# Patient Record
Sex: MALE | Race: WHITE | Employment: FULL TIME | URBAN - METROPOLITAN AREA
[De-identification: names, ages, dates, MRNs, and addresses within clinical notes are randomized per-mention and may not be internally consistent; named-entity substitution may affect disease eponyms.]

---

## 2017-11-01 ENCOUNTER — HOSPITAL ENCOUNTER (INPATIENT)
Facility: CLINIC | Age: 27
LOS: 5 days | Discharge: HOME | DRG: 897 | End: 2017-11-06
Attending: EMERGENCY MEDICINE | Admitting: PSYCHIATRY & NEUROLOGY
Payer: COMMERCIAL

## 2017-11-01 DIAGNOSIS — F11.23 OPIOID DEPENDENCE WITH WITHDRAWAL (H): ICD-10-CM

## 2017-11-01 DIAGNOSIS — F11.20 OPIOID USE DISORDER, SEVERE, DEPENDENCE (H): Primary | ICD-10-CM

## 2017-11-01 DIAGNOSIS — F41.9 ANXIETY DISORDER, UNSPECIFIED TYPE: ICD-10-CM

## 2017-11-01 DIAGNOSIS — F11.20 CONTINUOUS OPIOID DEPENDENCE (H): ICD-10-CM

## 2017-11-01 PROBLEM — F19.10 POLYSUBSTANCE ABUSE (H): Status: ACTIVE | Noted: 2017-11-01

## 2017-11-01 LAB
AMPHETAMINES UR QL SCN: NEGATIVE
BARBITURATES UR QL: NEGATIVE
BENZODIAZ UR QL: POSITIVE
CANNABINOIDS UR QL SCN: POSITIVE
COCAINE UR QL: NEGATIVE
ETHANOL UR QL SCN: NEGATIVE
OPIATES UR QL SCN: POSITIVE

## 2017-11-01 PROCEDURE — 12800012 ZZH R&B CD MH INTERMEDIATE ADULT

## 2017-11-01 PROCEDURE — 80320 DRUG SCREEN QUANTALCOHOLS: CPT | Performed by: FAMILY MEDICINE

## 2017-11-01 PROCEDURE — 99284 EMERGENCY DEPT VISIT MOD MDM: CPT | Mod: Z6 | Performed by: EMERGENCY MEDICINE

## 2017-11-01 PROCEDURE — 25000132 ZZH RX MED GY IP 250 OP 250 PS 637: Performed by: EMERGENCY MEDICINE

## 2017-11-01 PROCEDURE — 80307 DRUG TEST PRSMV CHEM ANLYZR: CPT | Performed by: FAMILY MEDICINE

## 2017-11-01 PROCEDURE — HZ2ZZZZ DETOXIFICATION SERVICES FOR SUBSTANCE ABUSE TREATMENT: ICD-10-PCS | Performed by: PSYCHIATRY & NEUROLOGY

## 2017-11-01 PROCEDURE — 99285 EMERGENCY DEPT VISIT HI MDM: CPT | Mod: 25 | Performed by: EMERGENCY MEDICINE

## 2017-11-01 PROCEDURE — 25000132 ZZH RX MED GY IP 250 OP 250 PS 637: Performed by: PSYCHIATRY & NEUROLOGY

## 2017-11-01 RX ORDER — FOLIC ACID 1 MG/1
1 TABLET ORAL DAILY
Status: DISCONTINUED | OUTPATIENT
Start: 2017-11-01 | End: 2017-11-06 | Stop reason: HOSPADM

## 2017-11-01 RX ORDER — LANOLIN ALCOHOL/MO/W.PET/CERES
100 CREAM (GRAM) TOPICAL DAILY
Status: DISPENSED | OUTPATIENT
Start: 2017-11-01 | End: 2017-11-04

## 2017-11-01 RX ORDER — ACETAMINOPHEN 325 MG/1
650 TABLET ORAL EVERY 4 HOURS PRN
Status: DISCONTINUED | OUTPATIENT
Start: 2017-11-01 | End: 2017-11-06 | Stop reason: HOSPADM

## 2017-11-01 RX ORDER — BUPRENORPHINE 2 MG/1
4 TABLET SUBLINGUAL
Status: DISCONTINUED | OUTPATIENT
Start: 2017-11-01 | End: 2017-11-06 | Stop reason: HOSPADM

## 2017-11-01 RX ORDER — BISACODYL 10 MG
10 SUPPOSITORY, RECTAL RECTAL DAILY PRN
Status: DISCONTINUED | OUTPATIENT
Start: 2017-11-01 | End: 2017-11-06 | Stop reason: HOSPADM

## 2017-11-01 RX ORDER — MULTIPLE VITAMINS W/ MINERALS TAB 9MG-400MCG
1 TAB ORAL DAILY
Status: DISCONTINUED | OUTPATIENT
Start: 2017-11-01 | End: 2017-11-06 | Stop reason: HOSPADM

## 2017-11-01 RX ORDER — ALUMINA, MAGNESIA, AND SIMETHICONE 2400; 2400; 240 MG/30ML; MG/30ML; MG/30ML
30 SUSPENSION ORAL EVERY 4 HOURS PRN
Status: DISCONTINUED | OUTPATIENT
Start: 2017-11-01 | End: 2017-11-06 | Stop reason: HOSPADM

## 2017-11-01 RX ORDER — BUPRENORPHINE 2 MG/1
4 TABLET SUBLINGUAL 2 TIMES DAILY
Status: DISCONTINUED | OUTPATIENT
Start: 2017-11-02 | End: 2017-11-06 | Stop reason: HOSPADM

## 2017-11-01 RX ORDER — HYDROXYZINE HYDROCHLORIDE 25 MG/1
25-50 TABLET, FILM COATED ORAL EVERY 4 HOURS PRN
Status: DISCONTINUED | OUTPATIENT
Start: 2017-11-01 | End: 2017-11-06 | Stop reason: HOSPADM

## 2017-11-01 RX ORDER — NICOTINE 21 MG/24HR
1 PATCH, TRANSDERMAL 24 HOURS TRANSDERMAL DAILY
Status: DISCONTINUED | OUTPATIENT
Start: 2017-11-01 | End: 2017-11-02 | Stop reason: ALTCHOICE

## 2017-11-01 RX ORDER — PHENOBARBITAL 32.4 MG/1
32.4 TABLET ORAL 3 TIMES DAILY
Status: DISCONTINUED | OUTPATIENT
Start: 2017-11-01 | End: 2017-11-02

## 2017-11-01 RX ORDER — DIAZEPAM 5 MG
5-20 TABLET ORAL EVERY 30 MIN PRN
Status: DISCONTINUED | OUTPATIENT
Start: 2017-11-01 | End: 2017-11-02

## 2017-11-01 RX ORDER — TRAZODONE HYDROCHLORIDE 50 MG/1
50 TABLET, FILM COATED ORAL
Status: DISCONTINUED | OUTPATIENT
Start: 2017-11-01 | End: 2017-11-06 | Stop reason: HOSPADM

## 2017-11-01 RX ADMIN — MULTIPLE VITAMINS W/ MINERALS TAB 1 TABLET: TAB at 16:43

## 2017-11-01 RX ADMIN — Medication 100 MG: at 16:43

## 2017-11-01 RX ADMIN — NICOTINE 1 PATCH: 21 PATCH, EXTENDED RELEASE TRANSDERMAL at 16:42

## 2017-11-01 RX ADMIN — PHENOBARBITAL 32.4 MG: 32.4 TABLET ORAL at 20:53

## 2017-11-01 RX ADMIN — NICOTINE POLACRILEX 4 MG: 4 GUM, CHEWING ORAL at 11:42

## 2017-11-01 RX ADMIN — PHENOBARBITAL 32.4 MG: 32.4 TABLET ORAL at 16:43

## 2017-11-01 RX ADMIN — FOLIC ACID 1 MG: 1 TABLET ORAL at 16:43

## 2017-11-01 ASSESSMENT — ENCOUNTER SYMPTOMS
SHORTNESS OF BREATH: 0
FEVER: 0
LIGHT-HEADEDNESS: 0
BACK PAIN: 0
DIFFICULTY URINATING: 0
ABDOMINAL PAIN: 0
VOMITING: 0
NECK STIFFNESS: 0
BRUISES/BLEEDS EASILY: 0
ADENOPATHY: 0
CHILLS: 0
COLOR CHANGE: 0
NAUSEA: 0
AGITATION: 0
POLYDIPSIA: 0
NECK PAIN: 0

## 2017-11-01 ASSESSMENT — ACTIVITIES OF DAILY LIVING (ADL)
DRESS: INDEPENDENT
ORAL_HYGIENE: INDEPENDENT
GROOMING: INDEPENDENT

## 2017-11-01 NOTE — PROGRESS NOTES
11/01/17 1421   Patient Belongings   Did you bring any home meds/supplements to the hospital?  Yes   Disposition of meds  Sent to security/pharmacy per site process   Patient Belongings clothing   Disposition of Belongings Kept with patient;Sent to security per site process;Other (see comment)   Belongings Search Yes   Clothing Search Yes   Second Staff GEM Weaver, RN completed search     Backpack, coat, shoes, belt in storage    Cell, wallet,  in locked drawer    Keys, tobacco, papers, e cig/juice, metal file in sharps    $22.00 cash, MC, 2 Visa, [place], meds in security    A               Admission:  I am responsible for any personal items that are not sent to the safe or pharmacy.  [facility] is not responsible for loss, theft or damage of any property in my possession.    Signature:  _________________________________ Date: _______  Time: _____                                              Staff Signature:  ____________________________ Date: ________  Time: _____      2nd Staff person, if patient is unable/unwilling to sign:    Signature: ________________________________ Date: ________  Time: _____     Discharge:  [facility] has returned all of my personal belongings:    Signature: _________________________________ Date: ________  Time: _____                                          Staff Signature:  ____________________________ Date: ________  Time: _____

## 2017-11-01 NOTE — PHARMACY-ADMISSION MEDICATION HISTORY
Admission medication history interview status for the October 8, 2017 admission is complete. See Epic admission navigator for allergy information, pharmacy, prior to admission medications and immunization status.     Medication history interview sources: Patient    Medication compliance: N/A - patient not prescribed medications    Changes made to PTA medication list (reason)  Added: none  Deleted: none  Changed: none    Additional medication history information (including reliability of information, actions taken by pharmacist):  - Patient denies taking/being prescribed prescription medications for over 6 months and denies taking over-the-counter (OTC) products such as vitamins, supplements.  - Pt takes magnesium tablets as needed for constipation.   - Pt was prescribed lisinopril sometime in 2016, however he has not taken it since early 2017. He says his blood pressure has been normal recently without it.  - He has not had a flu shot this year and is not certain if he wants one.       Prior to Admission medications    Not on File       Time spent: 5 minutes    Medication history completed by:   Belia Abreu, PharmD

## 2017-11-01 NOTE — ED PROVIDER NOTES
"  History     Chief Complaint   Patient presents with     Addiction Problem     \"My brother woke me up and brought me here.\" Opiate (heroin, oxycontin, cocaine) use. \"I use anything.\"     HPI  Elvin Goff is a 27-year-old man presenting with willingness to check into detox for management of opiate dependence. Patient smokes approximately 0.5 g of heroin on a daily basis. Last use was last night. No suicidal or homicidal thoughts. No fevers or pain. No other concerns or complaints.       This part of the document was transcribed by Jackelyn Fang, Medical Scribe.   I have reviewed the Medications, Allergies, Past Medical and Surgical History, and Social History in the [employer] system.  History reviewed. No pertinent past medical history.    History reviewed. No pertinent surgical history.    No family history on file.    Social History   Substance Use Topics     Smoking status: Current Every Day Smoker     Smokeless tobacco: Current User     Alcohol use Yes      Comment: 3-4 x per week       Current Facility-Administered Medications   Medication     hydrOXYzine (ATARAX) tablet 25-50 mg     acetaminophen (TYLENOL) tablet 650 mg     alum & mag hydroxide-simethicone (MYLANTA ES/MAALOX  ES) suspension 30 mL     magnesium hydroxide (MILK OF MAGNESIA) suspension 30 mL     bisacodyl (DULCOLAX) Suppository 10 mg     traZODone (DESYREL) tablet 50 mg     nicotine Patch in Place     [START ON 11/2/2017] nicotine patch REMOVAL     nicotine (NICODERM CQ) 21 MG/24HR 24 hr patch 1 patch     buprenorphine (SUBUTEX) sublingual tablet 4 mg     [START ON 11/2/2017] buprenorphine (SUBUTEX) sublingual tablet 4 mg     PHENobarbital (LUMINAL) tablet 32.4 mg     diazepam (VALIUM) tablet 5-20 mg     thiamine tablet 100 mg     folic acid (FOLVITE) tablet 1 mg     multivitamin, therapeutic with minerals (THERA-VIT-M) tablet 1 tablet      No Known Allergies    Review of Systems   Constitutional: Negative for chills and fever.   HENT: " "Negative for congestion.    Eyes: Negative for visual disturbance.   Respiratory: Negative for shortness of breath.    Cardiovascular: Negative for chest pain.   Gastrointestinal: Negative for abdominal pain, nausea and vomiting.   Endocrine: Negative for polydipsia and polyuria.   Genitourinary: Negative for difficulty urinating.   Musculoskeletal: Negative for back pain, neck pain and neck stiffness.   Skin: Negative for color change.   Neurological: Negative for light-headedness.   Hematological: Negative for adenopathy. Does not bruise/bleed easily.   Psychiatric/Behavioral: Negative for agitation, behavioral problems and suicidal ideas.       Physical Exam   BP: 106/69  Pulse: 71  Temp: 96.6  F (35.9  C)  Resp: 16  Height: 181.6 cm (5' 11.5\")  Weight: 72.1 kg (159 lb)  SpO2: 100 %      Physical Exam   Constitutional: He is oriented to person, place, and time. He appears well-developed and well-nourished. No distress.   HENT:   Head: Normocephalic and atraumatic.   Mouth/Throat: Oropharynx is clear and moist. No oropharyngeal exudate.   Eyes: Conjunctivae and EOM are normal. No scleral icterus.   Neck: Normal range of motion.   Cardiovascular: Normal rate, normal heart sounds and intact distal pulses.    Pulmonary/Chest: Effort normal and breath sounds normal. No respiratory distress. He has no wheezes.   Abdominal: Soft. Bowel sounds are normal. There is no tenderness.   Musculoskeletal: He exhibits no edema or tenderness.   Neurological: He is alert and oriented to person, place, and time. No cranial nerve deficit. He exhibits normal muscle tone. Coordination normal.   Skin: Skin is warm. No rash noted. He is not diaphoretic.   Psychiatric: He has a normal mood and affect. His behavior is normal. Judgment and thought content normal.   Nursing note and vitals reviewed.      ED Course     ED Course     Procedures             Critical Care time:  none             Labs Ordered and Resulted from Time of ED Arrival " Up to the Time of Departure from the ED   DRUG ABUSE SCREEN 6 CHEM DEP URINE ([facility]) - Abnormal; Notable for the following:        Result Value    Benzodiazepine Qual Urine Positive (*)     Cannabinoids Qual Urine Positive (*)     Opiates Qualitative Urine Positive (*)     All other components within normal limits            Assessments & Plan (with Medical Decision Making)   27-year-old man presenting with willingness to check into detox. There is a bed on hold for him. He ll be admitted to detox.     I have reviewed the nursing notes.    I have reviewed the findings, diagnosis, plan and need for follow up with the patient.  This part of the document was transcribed by Jackelyn Fang Medical Scribe.   There are no discharge medications for this patient.      Final diagnoses:   Continuous opioid dependence (H)     I was physically present and have reviewed and verified the accuracy of this note documented by my scribe.    Daniela Collier MD      11/1/2017   [facility], [facility] EMERGENCY DEPARTMENT     Daniela Collier MD  11/01/17 4166

## 2017-11-01 NOTE — IP AVS SNAPSHOT
[facility]    [address] [place] [id number]    Phone:  [phone]                                       After Visit Summary   11/1/2017    Elvin Goff    MRN: [id number]           After Visit Summary Signature Page     I have received my discharge instructions, and my questions have been answered. I have discussed any challenges I see with this plan with the nurse or doctor.    ..........................................................................................................................................  Patient/Patient Representative Signature      ..........................................................................................................................................  Patient Representative Print Name and Relationship to Patient    ..................................................               ................................................  Date                                            Time    ..........................................................................................................................................  Reviewed by Signature/Title    ...................................................              ..............................................  Date                                                            Time

## 2017-11-01 NOTE — PLAN OF CARE
Problem: General Plan of Care (Inpatient Behavioral)  Goal: Individualization/Patient Specific Goal (IP Behavioral)  The patient and/or their representative will achieve their patient-specific goals related to the plan of care.    The patient-specific goals include:   Patient will identify reason(s) for hospitalization from their perspective.  Patient will identify a goal for discharge.  Patient will identify triggers that may increase their risk for relapse.  Patient will identify coping skills they can do to stay well.   Patient will identify their support system to demonstrate readiness for discharge.

## 2017-11-01 NOTE — IP AVS SNAPSHOT
MRN:[id number]                      After Visit Summary   11/1/2017    Elvin Goff    MRN: [id number]           Thank you!     Thank you for choosing [facility] for your care. Our goal is always to provide you with excellent care.        Patient Information     Date Of Birth          1990        Designated Caregiver       Most Recent Value    Caregiver    Will someone help with your care after discharge? no      About your hospital stay     You were admitted on:  November 1, 2017 You last received care in the:  [facility]    You were discharged on:  November 6, 2017       Who to Call     For medical emergencies, please call 911.  For non-urgent questions about your medical care, please call your primary care provider or clinic, None          Attending Provider     Provider Specialty    Daniela Collier MD Emergency Medicine    Landry, Tori Soria MD Pediatrics       Primary Care Provider    Physician No Ref-Primary      Further instructions from your care team       Behavioral [place] Discharge Planning and Instructions  THANK YOU FOR CHOOSING THE [facility], HEALTH  [place] [address]: [phone]    Summary: You were admitted to [address] [place] [address] on November 1, 2017 for detoxification from heroin, sedative hypnotics and alcohol. Your withdrawal is stable and you are being discharged today.  A medical examination was performed that included lab work. You have met with a  and opted to trial Suboxone maintenance @ [facility] on November 7, 2017. You have a Chemical Dependency assessment scheduled at [facility] on Tuesday November 7, 2017 @ 1:00 PM. Your appointment is listed below.  Please make your recovery a priority, Elvin.     Main Diagnosis:  Polysubstance Dependence    Major Treatments, Procedures and Findings:  You were detoxified from opiates using Buprenorphine. Your Benzodiazepine withdrawal was managed with Phenobarbital.  You have had a chemical dependency assessment.  You have had blood drawn, and the results have been reviewed with you.  Please take a copy of your laboratory work with you to your next provider appointment.    Symptoms to Report:  If you experience more anxiety, confusion, sleeplessness, deep sadness or thoughts of suicide, notify your treatment team or notify your primary care physician. IF THE SYMPTOMS YOU ARE EXPERIENCING ARE A MEDICAL EMERGENCY, CALL 911 IMMEDIATELY.     Lifestyle Adjustment: Adjust your lifestyle to get enough sleep, relaxation, exercise and excellent nutrition. Continue to develop healthy coping skills to decrease stress and promote a sober living environment. Do not use alcohol, illegal drugs or addictive medications other than what is currently prescribed. AA, NA, and a sponsor are excellent resources for support. There has been a recent increase in opioid overdoses and deaths. After even a short period of no drug use your tolerance level with be lowered. It is unsafe to believe you can use the same amount of drugs that you did prior to this period of no drug use. Returning to your drug using environment and contact with your drug using friends will put you at a high risk of relapsing.     Suboxone Maintenance Follow-Up:  Date and Time: November 7, 2017 @ 8:00 AM  Provider  [facility]  [address] [place] [id number]  [phone]    Chemical Dependency Evaluation Follow-Up:  Date and Time: Tuesday November 7th at 1:00 pm  Provider: Tamar ASCENCIO  [address]  [place] [address]  [phone]    Primary Provider Follow Up-follow up if nausea and vomiting continue  [facility]  [address] [place] [address]  Phone: [phone]    Resources:  [facility] 727-792-2181 Support Group:  AA/NA and Sponsor/support.  Crisis Intervention: 337.670.1993 or 818-519-5171 (TTY: 707.854.6436). Call anytime for help.  National  [place] on Mental Illness (www.[place].salma.org): 556.835.6401 or 011-416-3538.  Alcoholics Anonymous (www.alcoholics-anonymous.org): Check your phone book for your local chapter.  Suicide Awareness Voices of Education (www.save.org): 636.974.8449  National Suicide Prevention Line (www.mentalAvePointmn.org): 938-736-MZSX (6187)  Mental Health Consumer/Survivor Network of [place] (www.mhcsn.net): 201.473.1545 or 879-392-4881.  Mental Health Association of [place] (www.mentalhealth.org): 527.702.2626 or 205-500-8109  Substance Abuse and Mental Health Services. (www.samhsa.gov)    [place] Recovery Connection ([facility])  [facility] connects people seeking recovery to resources that help foster and sustain long-term recovery.  Whether you are seeking resources for treatment, transportation, housing, job training, education, health care or other pathways to recovery, [facility] is a great place to start. 171.386.7157 www.[facility].org    General Medication Instruction: See your medication papers for instructions. Take all medicines as directed.  Make no changes unless your doctor suggests them. Go to all your doctor visits.  Be sure to have all your required lab tests. This way, your medicines may be refilled on time. Do not use any drugs not prescribed by your provider. AA/NA and sponsors are excellent resources for support. Avoid alcohol at all costs!    Please Note:  If you have any questions at anytime after you are discharged please call the [facility] [place] detoxification [address] at [phone].  [facility], Behavioral Intake [phone]. Please take this discharge folder with you to all your follow up appointments, it contains your lab results, diagnosis, medication list and discharge recommendations. [facility]OSING THE [facility]      Pending Results     No orders found from 10/30/2017 to 11/2/2017.            Statement of Approval     Ordered           "17 1113  I have reviewed and agree with all the recommendations and orders detailed in this document.  EFFECTIVE NOW     Approved and electronically signed by:  Tori Alatorre MD             Admission Information     Date & Time Provider Department Dept. Phone    2017 Tori Alatorre MD [facility] [phone]      Your Vitals Were     Blood Pressure Pulse Temperature Respirations Height Weight    99/66 61 97.3  F (36.3  C) 16 1.816 m (5' 11.5\") 71.2 kg (157 lb)    Pulse Oximetry BMI (Body Mass Index)                99% 21.59 kg/m2          MyChart Information     [employer] lets you send messages to your doctor, view your test results, renew your prescriptions, schedule appointments and more. To sign up, go to [email or web address] . Click on \"Log in\" on the left side of the screen, which will take you to the Welcome page. Then click on \"Sign up Now\" on the right side of the page.     You will be asked to enter the access code listed below, as well as some personal information. Please follow the directions to create your username and password.     Your access code is: [email or web address]  Expires: 2018  7:50 AM     Your access code will  in 90 days. If you need help or a new code, please call your [facility] clinic or [phone].        Care EveryWhere ID     This is your Care EveryWhere ID. This could be used by other organizations to access your [facility] medical records  [id number]        Equal Access to Services     NURY PRAKASH : Hadii librado andino hadasho Soomaali, waaxda luqadaha, qaybta kaalmada adeegyada, johnny sanders . So [facility] [phone].    ATENCIÓN: Si habla español, tiene a jacques disposición servicios gratuitos de asistencia lingüística. Llame al [phone].    We comply with applicable federal civil rights laws and [place] laws. We do not discriminate on the basis of race, color, national origin, age, disability, sex, " sexual orientation, or gender identity.               Review of your medicines      START taking        Dose / Directions    cloNIDine 0.1 MG/24HR WK patch   Commonly known as:  CATAPRES-TTS1        Dose:  1 patch   Place 1 patch onto the skin once a week   Quantity:  4 patch   Refills:  0       gabapentin 300 MG capsule   Commonly known as:  NEURONTIN        Dose:  300 mg   Take 1 capsule (300 mg) by mouth 3 times daily   Quantity:  90 capsule   Refills:  0       naloxone 1 mg/mL for intranasal kit (2 syringes with 2 mucosal atomizer device)   Commonly known as:  NARCAN        In opioid overdose put cone in nostril and push 1/2 of contents into each nostril.  Repeat every 3 min if no response until help arrives.   Quantity:  1 kit   Refills:  1            Where to get your medicines      These medications were sent to [facility] [place], [place] - [address]  [address] [address]     Phone:  [phone]     cloNIDine 0.1 MG/24HR WK patch    gabapentin 300 MG capsule    naloxone 1 mg/mL for intranasal kit (2 syringes with 2 mucosal atomizer device)                Protect others around you: Learn how to safely use, store and throw away your medicines at www.disposemymeds.org.             Medication List: This is a list of all your medications and when to take them. Check marks below indicate your daily home schedule. Keep this list as a reference.      Medications           Morning Afternoon Evening Bedtime As Needed    cloNIDine 0.1 MG/24HR WK patch   Commonly known as:  CATAPRES-TTS1   Place 1 patch onto the skin once a week   Last time this was given:  1 patch on 11/3/2017 10:02 AM                                gabapentin 300 MG capsule   Commonly known as:  NEURONTIN   Take 1 capsule (300 mg) by mouth 3 times daily                                         naloxone 1 mg/mL for intranasal kit (2 syringes with 2 mucosal atomizer device)   Commonly known as:  NARCAN   In  opioid overdose put cone in nostril and push 1/2 of contents into each nostril.  Repeat every 3 min if no response until help arrives.

## 2017-11-02 PROBLEM — F41.9 ANXIETY DISORDER, UNSPECIFIED TYPE: Status: ACTIVE | Noted: 2017-11-02

## 2017-11-02 PROBLEM — F11.23 OPIOID DEPENDENCE WITH WITHDRAWAL (H): Status: ACTIVE | Noted: 2017-11-02

## 2017-11-02 PROBLEM — F13.939: Status: ACTIVE | Noted: 2017-11-02

## 2017-11-02 PROBLEM — F11.20 OPIOID USE DISORDER, SEVERE, DEPENDENCE (H): Status: ACTIVE | Noted: 2017-11-01

## 2017-11-02 PROBLEM — F13.20 SEVERE SEDATIVE, HYPNOTIC, OR ANXIOLYTIC USE DISORDER (H): Status: ACTIVE | Noted: 2017-11-02

## 2017-11-02 LAB
ALBUMIN SERPL-MCNC: 3.9 G/DL (ref 3.4–5)
ALP SERPL-CCNC: 87 U/L (ref 40–150)
ALT SERPL W P-5'-P-CCNC: 22 U/L (ref 0–70)
ANION GAP SERPL CALCULATED.3IONS-SCNC: 6 MMOL/L (ref 3–14)
AST SERPL W P-5'-P-CCNC: 15 U/L (ref 0–45)
BILIRUB SERPL-MCNC: 0.6 MG/DL (ref 0.2–1.3)
BUN SERPL-MCNC: 17 MG/DL (ref 7–30)
CALCIUM SERPL-MCNC: 9.4 MG/DL (ref 8.5–10.1)
CHLORIDE SERPL-SCNC: 107 MMOL/L (ref 94–109)
CO2 SERPL-SCNC: 30 MMOL/L (ref 20–32)
CREAT SERPL-MCNC: 0.73 MG/DL (ref 0.66–1.25)
ERYTHROCYTE [DISTWIDTH] IN BLOOD BY AUTOMATED COUNT: 12 % (ref 10–15)
GFR SERPL CREATININE-BSD FRML MDRD: >90 ML/MIN/1.7M2
GLUCOSE SERPL-MCNC: 105 MG/DL (ref 70–99)
HBV CORE AB SERPL QL IA: NONREACTIVE
HBV SURFACE AG SERPL QL IA: NONREACTIVE
HCT VFR BLD AUTO: 42.7 % (ref 40–53)
HGB BLD-MCNC: 14.4 G/DL (ref 13.3–17.7)
HIV 1+2 AB+HIV1 P24 AG SERPL QL IA: NONREACTIVE
MCH RBC QN AUTO: 30.6 PG (ref 26.5–33)
MCHC RBC AUTO-ENTMCNC: 33.7 G/DL (ref 31.5–36.5)
MCV RBC AUTO: 91 FL (ref 78–100)
PLATELET # BLD AUTO: 167 10E9/L (ref 150–450)
POTASSIUM SERPL-SCNC: 3.8 MMOL/L (ref 3.4–5.3)
PROT SERPL-MCNC: 7 G/DL (ref 6.8–8.8)
RBC # BLD AUTO: 4.71 10E12/L (ref 4.4–5.9)
SODIUM SERPL-SCNC: 143 MMOL/L (ref 133–144)
WBC # BLD AUTO: 9.1 10E9/L (ref 4–11)

## 2017-11-02 PROCEDURE — 25000132 ZZH RX MED GY IP 250 OP 250 PS 637: Performed by: PSYCHIATRY & NEUROLOGY

## 2017-11-02 PROCEDURE — 85027 COMPLETE CBC AUTOMATED: CPT | Performed by: PSYCHIATRY & NEUROLOGY

## 2017-11-02 PROCEDURE — H2035 A/D TX PROGRAM, PER HOUR: HCPCS | Mod: HQ

## 2017-11-02 PROCEDURE — 12800012 ZZH R&B CD MH INTERMEDIATE ADULT

## 2017-11-02 PROCEDURE — 87389 HIV-1 AG W/HIV-1&-2 AB AG IA: CPT | Performed by: PSYCHIATRY & NEUROLOGY

## 2017-11-02 PROCEDURE — 80053 COMPREHEN METABOLIC PANEL: CPT | Performed by: PSYCHIATRY & NEUROLOGY

## 2017-11-02 PROCEDURE — 87340 HEPATITIS B SURFACE AG IA: CPT | Performed by: PSYCHIATRY & NEUROLOGY

## 2017-11-02 PROCEDURE — 86803 HEPATITIS C AB TEST: CPT | Performed by: PSYCHIATRY & NEUROLOGY

## 2017-11-02 PROCEDURE — 86704 HEP B CORE ANTIBODY TOTAL: CPT | Performed by: PSYCHIATRY & NEUROLOGY

## 2017-11-02 PROCEDURE — 36415 COLL VENOUS BLD VENIPUNCTURE: CPT | Performed by: PSYCHIATRY & NEUROLOGY

## 2017-11-02 PROCEDURE — 99223 1ST HOSP IP/OBS HIGH 75: CPT | Mod: AI | Performed by: PSYCHIATRY & NEUROLOGY

## 2017-11-02 RX ORDER — PHENOBARBITAL 32.4 MG/1
32.4 TABLET ORAL 2 TIMES DAILY
Status: DISPENSED | OUTPATIENT
Start: 2017-11-03 | End: 2017-11-04

## 2017-11-02 RX ORDER — PHENOBARBITAL 32.4 MG/1
32.4 TABLET ORAL ONCE
Status: COMPLETED | OUTPATIENT
Start: 2017-11-04 | End: 2017-11-04

## 2017-11-02 RX ORDER — PHENOBARBITAL 32.4 MG/1
32.4 TABLET ORAL 3 TIMES DAILY
Status: COMPLETED | OUTPATIENT
Start: 2017-11-02 | End: 2017-11-02

## 2017-11-02 RX ORDER — GABAPENTIN 300 MG/1
300 CAPSULE ORAL 3 TIMES DAILY PRN
Status: DISCONTINUED | OUTPATIENT
Start: 2017-11-02 | End: 2017-11-06 | Stop reason: HOSPADM

## 2017-11-02 RX ORDER — ONDANSETRON 4 MG/1
4 TABLET, ORALLY DISINTEGRATING ORAL EVERY 6 HOURS PRN
Status: DISCONTINUED | OUTPATIENT
Start: 2017-11-02 | End: 2017-11-06 | Stop reason: HOSPADM

## 2017-11-02 RX ADMIN — PHENOBARBITAL 32.4 MG: 32.4 TABLET ORAL at 08:26

## 2017-11-02 RX ADMIN — PHENOBARBITAL 32.4 MG: 32.4 TABLET ORAL at 16:25

## 2017-11-02 RX ADMIN — FOLIC ACID 1 MG: 1 TABLET ORAL at 08:26

## 2017-11-02 RX ADMIN — NICOTINE 1 PATCH: 21 PATCH, EXTENDED RELEASE TRANSDERMAL at 08:26

## 2017-11-02 RX ADMIN — NICOTINE POLACRILEX 8 MG: 4 GUM, CHEWING ORAL at 18:41

## 2017-11-02 RX ADMIN — TRAZODONE HYDROCHLORIDE 50 MG: 50 TABLET ORAL at 20:19

## 2017-11-02 RX ADMIN — Medication 100 MG: at 08:26

## 2017-11-02 RX ADMIN — BUPRENORPHINE HCL 4 MG: 2 TABLET SUBLINGUAL at 08:25

## 2017-11-02 RX ADMIN — PHENOBARBITAL 32.4 MG: 32.4 TABLET ORAL at 20:17

## 2017-11-02 ASSESSMENT — ACTIVITIES OF DAILY LIVING (ADL)
DRESS: INDEPENDENT
GROOMING: INDEPENDENT
ORAL_HYGIENE: INDEPENDENT
GROOMING: INDEPENDENT

## 2017-11-02 NOTE — H&P
"Addiction Medicine History and Physical    Elvin Goff MRN# [id number]   Age: 27 year old YOB: 1990     Date of Admission:  11/1/2017  Date of H&P:   November 2, 2017    Home clinic: Denies  Primary care provider: No Ref-Primary, Physician          Assessment and Plan:   Assessment:   Principal Problem:    Sedative withdrawal (H)  Active Problems:    Opioid use disorder, severe, dependence (H)    Opioid dependence with withdrawal (H)    Severe sedative, hypnotic, or anxiolytic use disorder (H)    Moderate alcohol use disorder, with binge pattern    Anxiety disorder, unspecified type, with both social and panic components        Plan:   Medications:  - Buprenorphine 4 mg BID today, consider continuation versus taper tomorrow  - Phenobarbital taper, brief, as significant withdrawal unlikely given duration and pattern of use  - follow-up blood borne pathogen testing  Disposition:  - See CM notes for details, but at this point patient willing to speak with CM but declining specific intervention/treatment; if willing, might be DDP IOP candidate           Chief Complaint:   \"My family gave me an ultimatum.\"     History is obtained from the patient          History of Present Illness:   This patient is a 27 year old  male with a significant past medical history of gastritis due to alcohol who presents with opiate and benzodiazepine use. Reports that he was recently laid off from his job of [employer] which is somewhat due to seasonal changes and also probably somewhat from substance use. Since he hasn't been working, has been drinking at home which he thinks was concerning to his mother since he's been passed out at home quite a bit. Reports using heroin daily for about a year at this point and has used for about 10 years in total. 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 Elvin has been back in [place] for about a year and a half, lives with his mom and older brother in [place]. Additionally, has been using benzodiazepines recently and reports either using up to 4 mg daily of alprazolam or 5-10 mg daily of etizolam. Buys both of these online. Drinks beer until he blacks out. Reports only drinking beer after having been diagnosed with gastritis on a few occasions after drinking liquor. Last use of heroin was around midnight last night, had been using about half gram daily, mixed smoked and IV. Last IV use 3 days PTA. Got first dose of buprenorphine this morning and feels it is helping manage symptoms of withdrawal he was experiencing (nausea, insomnia, rhinorrhea, watery eyes). At this point, he is unsure of how he'd like to proceed but is willing to stay until tomorrow. May be interested in methadone maintenance and is willing to hear about various treatment options but currently somewhat hesitant. Of note, reports history of significant social anxiety for which substances seem to help manage. Has a history of panic attack in the context of withdrawal and reports low mood for as long as he can remember. Denies h/o thoughts of suicide. In past week had some ulnar neuropathy symptoms after passing out while intoxicated. Strength is improved in this (right) hand. Missed vein on dorsum of left had within the past week - site healed. Is admitted voluntarily.           Past Medical History:   I  History reviewed. No pertinent past medical history. Patient denies.         Past Surgical History:    I have reviewed this patient's past surgical history  History reviewed. No pertinent surgical history.          Social History:   I have reviewed this patient's social history, see HPI, and living with mother and brother, although they have \"given ultimatum\" re: treatment, so may be displaced if he does not follow through with any " "treatment recommendations.          Family History:   I have reviewed this patient's family history, and it is not directly pertinent to present encounter for detoxification.         Immunizations:     There is no immunization history on file for this patient.          Allergies:   All allergies reviewed and addressed  No Known Allergies          Medications:   No prescriptions prior to admission.    Found with small supply of etizolam on his person at admission.    [place]  query result:  Indicates no controlled prescriptions reported in previous 12 months.         Review of Systems:   Complete ROS performed and is negative except as indicated in HPI, and elsewhere in this note.           Physical Exam:     Vitals were reviewed  Patient Vitals for the past 12 hrs:   BP Temp Temp src Pulse Resp   11/01/17 2012 125/87 98.3  F (36.8  C) Tympanic 76 16     Constitutional:   Tired but alert, no distress     Eyes:   Anicteric, mild injection, PERRL, no nystagmus     ENT:   No rhinorrhea, mmm     Lungs:   no increased work of breathing and clear to auscultation     Cardiovascular:   Well-perfused, no murmur, no edema     Neurologic:   No tremor, normal tone, gait and coordination intact     Skin:   Injection sites without infectious signs, no jaundice, mild flushing     MENTAL STATUS EXAM  Appearance: in scrubs, disheveled  Attitude: reticent but cooperative  Behavior: no agitation or slowing  Eye Contact: focused on examiner  Speech: coherent, no pressuring  Orientation: oriented to person , place, time and situation  Mood:  \"fine\"  Affect: tired, disengaged  Thought Process: goal-directed, no FOI or BARRY  Suicidal Ideation: denies thought/intent/plan  Hallucination: denies  Insight: limited  Judgment: adequate for safety         Data:   All laboratory data reviewed  Results for orders placed or performed during the hospital encounter of 11/01/17   Drug abuse screen 6 urine (tox)   Result Value Ref Range    Amphetamine Qual " Urine Negative NEG^Negative    Barbiturates Qual Urine Negative NEG^Negative    Benzodiazepine Qual Urine Positive (A) NEG^Negative    Cannabinoids Qual Urine Positive (A) NEG^Negative    Cocaine Qual Urine Negative NEG^Negative    Ethanol Qual Urine Negative NEG^Negative    Opiates Qualitative Urine Positive (A) NEG^Negative   CBC with platelets   Result Value Ref Range    WBC 9.1 4.0 - 11.0 10e9/L    RBC Count 4.71 4.4 - 5.9 10e12/L    Hemoglobin 14.4 13.3 - 17.7 g/dL    Hematocrit 42.7 40.0 - 53.0 %    MCV 91 78 - 100 fl    MCH 30.6 26.5 - 33.0 pg    MCHC 33.7 31.5 - 36.5 g/dL    RDW 12.0 10.0 - 15.0 %    Platelet Count 167 150 - 450 10e9/L   Comprehensive metabolic panel   Result Value Ref Range    Sodium 143 133 - 144 mmol/L    Potassium 3.8 3.4 - 5.3 mmol/L    Chloride 107 94 - 109 mmol/L    Carbon Dioxide 30 20 - 32 mmol/L    Anion Gap 6 3 - 14 mmol/L    Glucose 105 (H) 70 - 99 mg/dL    Urea Nitrogen 17 7 - 30 mg/dL    Creatinine 0.73 0.66 - 1.25 mg/dL    GFR Estimate >90 >60 mL/min/1.7m2    GFR Estimate If Black >90 >60 mL/min/1.7m2    Calcium 9.4 8.5 - 10.1 mg/dL    Bilirubin Total 0.6 0.2 - 1.3 mg/dL    Albumin 3.9 3.4 - 5.0 g/dL    Protein Total 7.0 6.8 - 8.8 g/dL    Alkaline Phosphatase 87 40 - 150 U/L    ALT 22 0 - 70 U/L    AST 15 0 - 45 U/L      Attestation:  I have reviewed today's vital signs, notes, medications, and labs/studies pertinent to this encounter.    Indication for hospitalization is severe opioid use disorder with physical dependence and withdrawal; high degree of complexity due to potential withdrawal morbidity, complicated by co-occurring unknown sedative use with unknown withdrawal potential, combined substance use including alcohol and cannabis, and co-occurring unmanaged anxiety.    Dakota Alatorre MD  Pediatrics/Addiction Medicine

## 2017-11-02 NOTE — PROGRESS NOTES
Behavioral Health  Note    Behavioral Health  Spirituality Group Note    UNIT 3AW    Name: Elvin Goff YOB: 1990   MRN: [id number] Age: 27 year old      Patient attended -led group, which included discussion of spirituality, coping with illness and building resilience.    Patient attended group for 1.0 hrs.    The patient actively participated in group discussion and patient demonstrated an appreciation of topic's application for their personal circumstances. Elvin completed some brief Qi Gong movements and participated in a conversation around discernment and blessings.     Topic/Focus of today's spirituality group: [place] from Within (self)  IMR tie-in: Taking Action      Vivienne Jo MDiv, [facility]  Staff   Pager [phone]

## 2017-11-02 NOTE — PLAN OF CARE
Problem: General Plan of Care (Inpatient Behavioral)  Goal: Team Discussion  Team Plan:   BEHAVIORAL TEAM DISCUSSION     Participants: Dr. Alatorre; QUITA Ayala, SHAHIDAC; Dean De Oliveira, RN  Progress: Initial  Continued Stay Criteria/Rationale: Heroin detox  Medical/Physical: Deferred to medicine  Precautions:   Behavioral Orders   Procedures     Code 1 - Restrict to Unit     Routine Programming       As clinically indicated     Status 15       Every 15 minutes.     Withdrawal precautions   Plan: Writer met with patient to establish discharge. Reports heroin and benzo use. Patient ambilivant about change, shows some insight, but is limited. Says 80% of him wants to be clean the other 20% runs the show. Patient is willing to trail methadone maintenance stating he has tried Suboxone in the past and was nauseated.  Writer spoke with Cecilia Crowley HP coordinator ([phone]) and was given the contact information to [facility] which is network for him. Writer called and provided insurance information, the  will contact patient directly on the unit to complete intake and schedule appointment. CTC to contact Ramon Liz on Friday to confirm appointment [phone].  Rationale for change in precautions or plan: No change        Problem: Patient Care Overview  Goal: Team Discussion  Team Plan:   BEHAVIORAL TEAM DISCUSSION     Participants: Dr. Alatorre; QUITA Ayala, SONU; Dean De Oliveira, RN  Progress: Initial  Continued Stay Criteria/Rationale: Heroin detox  Medical/Physical: Deferred to medicine  Precautions:   Behavioral Orders   Procedures     Code 1 - Restrict to Unit     Routine Programming       As clinically indicated     Status 15       Every 15 minutes.     Withdrawal precautions   Plan: Writer met with patient to establish discharge. Reports heroin and benzo use. Patient ambilivant about change, shows some insight, but is limited. Says 80% of him wants to be clean the other 20% runs  the show. Patient is willing to trail methadone maintenance stating he has tried Suboxone in the past and was nauseated.  Writer spoke with Cecilia Crowley HP coordinator ([phone]) and was given the contact information to [facility] which is network for him. Writer called and provided insurance information, the  will contact patient directly on the unit to complete intake and schedule appointment. CTC to contact Ramon Liz on Friday to confirm appointment [phone].  Rationale for change in precautions or plan: No change

## 2017-11-03 PROCEDURE — 12800012 ZZH R&B CD MH INTERMEDIATE ADULT

## 2017-11-03 PROCEDURE — 25000132 ZZH RX MED GY IP 250 OP 250 PS 637: Performed by: PSYCHIATRY & NEUROLOGY

## 2017-11-03 PROCEDURE — 25000125 ZZHC RX 250: Performed by: PSYCHIATRY & NEUROLOGY

## 2017-11-03 PROCEDURE — 25000128 H RX IP 250 OP 636: Performed by: INTERNAL MEDICINE

## 2017-11-03 RX ORDER — BUPRENORPHINE 2 MG/1
4 TABLET SUBLINGUAL
Status: DISCONTINUED | OUTPATIENT
Start: 2017-11-03 | End: 2017-11-06 | Stop reason: HOSPADM

## 2017-11-03 RX ORDER — PROCHLORPERAZINE MALEATE 5 MG
5 TABLET ORAL EVERY 6 HOURS PRN
Status: DISCONTINUED | OUTPATIENT
Start: 2017-11-03 | End: 2017-11-06 | Stop reason: HOSPADM

## 2017-11-03 RX ORDER — GABAPENTIN 300 MG/1
300 CAPSULE ORAL 3 TIMES DAILY
Qty: 90 CAPSULE | Refills: 0 | Status: SHIPPED | OUTPATIENT
Start: 2017-11-03 | End: 2018-01-01

## 2017-11-03 RX ADMIN — PROCHLORPERAZINE EDISYLATE 5 MG: 5 INJECTION INTRAMUSCULAR; INTRAVENOUS at 17:45

## 2017-11-03 RX ADMIN — PHENOBARBITAL 32.4 MG: 32.4 TABLET ORAL at 20:49

## 2017-11-03 RX ADMIN — BUPRENORPHINE HCL 4 MG: 2 TABLET SUBLINGUAL at 08:35

## 2017-11-03 RX ADMIN — BUPRENORPHINE HCL 4 MG: 2 TABLET SUBLINGUAL at 17:57

## 2017-11-03 RX ADMIN — ONDANSETRON 4 MG: 4 TABLET, ORALLY DISINTEGRATING ORAL at 10:02

## 2017-11-03 RX ADMIN — CLONIDINE 1 PATCH: 0.1 PATCH TRANSDERMAL at 10:02

## 2017-11-03 NOTE — PLAN OF CARE
"Problem: General Plan of Care (Inpatient Behavioral)  Goal: Discharge Planning  SBAR  Elvin Goff is a 27 year old year old male with a chief complaint of Addiction Problem (\"My brother woke me up and brought me here.\" Opiate (heroin, oxycontin, cocaine) use. \"I use anything.\")    S = Situation:   Patient's brother requested a care conference  B  = Background:   With Jasson's permission, Patient's brother and Jasson asked questions about current treatment regimen, plan for discharge and future expectations for Jasson's continued recovery.  A  =  Assessment:   Patient' brother presented poor understanding of the depth and breadth of Jasson's chemical dependency issue.  He reports that he has been pre-occupied with his other brother's alcoholism and has likely been in denial about Jasson's opiate withdrawal.  Discussed Jasson's current medication regimen, the possibility of starting methadone maintenance or continuing suboxone maintenance, Jasson's social network, drug using friends, including those who drink heavily, and cross addiction.  Jasson lacks sober support and was dismissive about having friends or family in recovery.  Jasson asked if he could leave, be discharged.  Pt was informed that he needs a doctor's order and he should discuss bridging therapy with Dr. Alatorre and the plan for taper or termination of the phenobarbital.  Pt verbalized understanding.  Pt's brother is supportive and wants to help but he is also an overseer of his other brother who is abusing alcohol and an elderly mother--he also works 60 hours a week.  R =   Request or Recommendation:   Discussed the importance of establishing a recovery program, finding sober support and making a decision about maintenance therapy and following through with it.  Jasson focused on questions regarding speedy discharge during the meeting with his brother.  He has poor insight into the seriousness of his addiction.  If patient is agreeable to " participating in an outpatient program it will be on his brother's urging, unlikely his own volition.

## 2017-11-03 NOTE — PROGRESS NOTES
Addiction Medicine Progress Note          Assessment and Plan:   Assessment:   Principal Problem:    Sedative withdrawal (H), minimal, but completing brief barbiturate taper  Active Problems:    Opioid use disorder, severe, dependence (H)    Opioid dependence with withdrawal (H), continues as patient declined evening dose buprenorphine last night    Severe sedative, hypnotic, or anxiolytic use disorder (H)    Anxiety disorder, unspecified type, with social and panic components        Plan:   Encourage patient to continue buprenorphine induction  He is contemplating buprenorphine versus methadone maintenance, and would benefit from services that could address underlying anxiety  He is encouraged to stay until symptoms stabilize, but may discharge if requested         Interval History:   No acute events. Did stay overnight, but declined PM dose buprenorphine so felt quite sick this morning. Wiling to take AM dose but remains ambivalent about maintenance options and about completing treatment course here as well as treatment planning. He was informed that we added hepatitis C as add on to lab tests - he declines new blood draw if needed.            Review of Systems:   Complete ROS performed and is negative except as indicated in interval history and elsewhere in this note.           Medications:     Current Facility-Administered Medications   Medication     ondansetron (ZOFRAN-ODT) ODT tab 4 mg     PHENobarbital (LUMINAL) tablet 32.4 mg     [START ON 11/4/2017] PHENobarbital (LUMINAL) tablet 32.4 mg     gabapentin (NEURONTIN) capsule 300 mg     nicotine polacrilex (NICORETTE) gum 4-8 mg     hydrOXYzine (ATARAX) tablet 25-50 mg     acetaminophen (TYLENOL) tablet 650 mg     alum & mag hydroxide-simethicone (MYLANTA ES/MAALOX  ES) suspension 30 mL     magnesium hydroxide (MILK OF MAGNESIA) suspension 30 mL     bisacodyl (DULCOLAX) Suppository 10 mg     traZODone (DESYREL) tablet 50 mg     buprenorphine (SUBUTEX)  "sublingual tablet 4 mg     buprenorphine (SUBUTEX) sublingual tablet 4 mg     thiamine tablet 100 mg     folic acid (FOLVITE) tablet 1 mg     multivitamin, therapeutic with minerals (THERA-VIT-M) tablet 1 tablet          Physical Exam:     Vitals were reviewed  Patient Vitals for the past 12 hrs:   BP Temp Temp src Pulse Resp   11/02/17 2014 126/88 99.3  F (37.4  C) Tympanic 78 16     Constitutional:   Tired but alert, non-toxic, in some withdrawal distress     Eyes:   Anicteric, no injection, (+) mydriasis, PERRL     ENT:   Clear rhinorrhea, mmm     Lungs:   no increased work of breathing and clear to auscultation     Cardiovascular:   Well-perfused, no murmur, no edema     Neurologic:   No tremor, normal tone, gait and coordination intact     Skin:   Mild flushing, no jaundice, injection sites clear     MENTAL STATUS EXAM  Appearance: in scrubs, unkempt  Attitude: reticent but cooperative  Behavior: no agitation or slowing  Eye Contact: focused on examiner  Speech: coherent, no pressuring  Orientation: oriented to person , place, time and situation  Mood: \"fine\"  Affect: muted, with anxious tension  Thought Process: goal-directed, no FOI or BARRY  Suicidal Ideation: denies thought/intent/plan  Hallucination: denies  Insight: limited  Judgment: adequate for safety          Data:   All laboratory data reviewed  Hep B and HIV negative/NR. Hep C missed in admit labs, added on to existing specimen.    Attestation:  I have reviewed today's vital signs, notes, medications, and labs/studies pertinent to this encounter.    Dakota Alatorre MD  Pediatrics/Addiction Medicine    "

## 2017-11-03 NOTE — PROGRESS NOTES
The patient has been in his room most of the shift.  He has complained of diarrhea and of vomiting.  He has been given Imodium and Zofran.  He was concerned that he had blood in his vomitus, but it did not look like blood to the writer.  The patient had skipped a dose of Buprenorphine last night.  Dr. Alatorre was informed of the patient's condition and she said to push fluids and that we could give an extra dose of Buprenorphine tonight if his score was 9 or above.

## 2017-11-04 PROCEDURE — 12800012 ZZH R&B CD MH INTERMEDIATE ADULT

## 2017-11-04 PROCEDURE — 25000125 ZZHC RX 250: Performed by: PSYCHIATRY & NEUROLOGY

## 2017-11-04 PROCEDURE — 36415 COLL VENOUS BLD VENIPUNCTURE: CPT | Performed by: PSYCHIATRY & NEUROLOGY

## 2017-11-04 PROCEDURE — 86803 HEPATITIS C AB TEST: CPT | Performed by: PSYCHIATRY & NEUROLOGY

## 2017-11-04 PROCEDURE — 25000132 ZZH RX MED GY IP 250 OP 250 PS 637: Performed by: PSYCHIATRY & NEUROLOGY

## 2017-11-04 RX ADMIN — MULTIPLE VITAMINS W/ MINERALS TAB 1 TABLET: TAB at 08:34

## 2017-11-04 RX ADMIN — NICOTINE POLACRILEX 8 MG: 4 GUM, CHEWING ORAL at 20:37

## 2017-11-04 RX ADMIN — NICOTINE POLACRILEX 8 MG: 4 GUM, CHEWING ORAL at 08:34

## 2017-11-04 RX ADMIN — TRAZODONE HYDROCHLORIDE 50 MG: 50 TABLET ORAL at 22:09

## 2017-11-04 RX ADMIN — BUPRENORPHINE HCL 4 MG: 2 TABLET SUBLINGUAL at 08:34

## 2017-11-04 RX ADMIN — BUPRENORPHINE HCL 4 MG: 2 TABLET SUBLINGUAL at 16:29

## 2017-11-04 RX ADMIN — FOLIC ACID 1 MG: 1 TABLET ORAL at 08:34

## 2017-11-04 RX ADMIN — ONDANSETRON 4 MG: 4 TABLET, ORALLY DISINTEGRATING ORAL at 06:48

## 2017-11-04 RX ADMIN — ONDANSETRON 4 MG: 4 TABLET, ORALLY DISINTEGRATING ORAL at 16:28

## 2017-11-04 RX ADMIN — PHENOBARBITAL 32.4 MG: 32.4 TABLET ORAL at 08:34

## 2017-11-04 ASSESSMENT — ACTIVITIES OF DAILY LIVING (ADL)
LAUNDRY: WITH SUPERVISION
GROOMING: INDEPENDENT
DRESS: INDEPENDENT
ORAL_HYGIENE: INDEPENDENT

## 2017-11-04 NOTE — PROVIDER NOTIFICATION
"S:  Pt had a large emesis (~350-400) mostly liquid (bile colored) and frothy mucus.  Writer sent a text message to the on duty Internal Medicine team member: (Katia Peguero via [employer] system).  Writer requested compazine when the provider returned the call.  The provider stated that she had not been covering for this pt and declined to write orders for him.  He was assigned to Dr. Alatorre and the IM provider told writer to call Dr. MAGUIRE. It was after 17:00.  Writer paged Dr. MAGUIRE.   No call was received immediately, so writer contacted the [facility] and obtained a one time order for prochlorperazine 5 mg IM was given with additional doses to be given PO.  He declined his supper tray, \"I don't want to risk it\", he said.  R:  Administered prochlorperazine 5 mg.  Provided pt with lemon-lime and gingerale soda.  Phenobarbital 32.4 mg was administered for benzodiazepine withdrawal.  Continue to monitor and medicate as ordered and indicated.  "

## 2017-11-04 NOTE — PLAN OF CARE
Problem: Substance Withdrawal  Goal: Substance Withdrawal  Signs and symptoms of listed problems will be absent or manageable.   Outcome: No Change  Patient's opiate assessment scores were 4/5 and he continues on Buprenorphine 4 mg bid receiving one dose this morning. His benzodiazepine scores were 7/6 and he was medicated with Phenobarbital 32.4 mg times one. Patient heart rates were low at 67 and 56. Blood pressure low at 98/62 at mid day. He would like to be discharged tomorrow. Discharge medications have been ordered and there is a patient care order from Dr. Alatorre that he can go. Will discuss with Dr. Wakefield tomorrow.  Patient will be discharged without Suboxone, however he has a supply at home. He denies any anxiety and endorses mild depression. He denies any thoughts of self harm, denies any thoughts of harming others and denies hallucinations.

## 2017-11-04 NOTE — PROGRESS NOTES
Writer checked in with pt to verify that phone screen for suboxone maintenance with [facility] was complete on Friday 11/3. Pt reports that they did not call him. Writer encouraged pt to stay through the weekend and into Monday so that CM can follow up on 11/6 with scheduling suboxone maintenance appointment. Pt verbalized that he will do this. Rule 25 is scheduled with [facility] Maste on 11/7. Pt is aware.  CM to continue on Monday 11/6.

## 2017-11-05 PROCEDURE — 25000128 H RX IP 250 OP 636: Performed by: PSYCHIATRY & NEUROLOGY

## 2017-11-05 PROCEDURE — 25000132 ZZH RX MED GY IP 250 OP 250 PS 637: Performed by: INTERNAL MEDICINE

## 2017-11-05 PROCEDURE — 25000125 ZZHC RX 250: Performed by: PSYCHIATRY & NEUROLOGY

## 2017-11-05 PROCEDURE — 12800012 ZZH R&B CD MH INTERMEDIATE ADULT

## 2017-11-05 PROCEDURE — 25000132 ZZH RX MED GY IP 250 OP 250 PS 637: Performed by: PSYCHIATRY & NEUROLOGY

## 2017-11-05 RX ADMIN — TRAZODONE HYDROCHLORIDE 50 MG: 50 TABLET ORAL at 01:05

## 2017-11-05 RX ADMIN — HYDROXYZINE HYDROCHLORIDE 50 MG: 25 TABLET ORAL at 01:05

## 2017-11-05 RX ADMIN — BUPRENORPHINE HCL 4 MG: 2 TABLET SUBLINGUAL at 08:13

## 2017-11-05 RX ADMIN — ONDANSETRON 4 MG: 4 TABLET, ORALLY DISINTEGRATING ORAL at 11:57

## 2017-11-05 RX ADMIN — PROCHLORPERAZINE MALEATE 5 MG: 5 TABLET, FILM COATED ORAL at 07:48

## 2017-11-05 RX ADMIN — PROCHLORPERAZINE MALEATE 5 MG: 5 TABLET, FILM COATED ORAL at 20:59

## 2017-11-05 RX ADMIN — ONDANSETRON 4 MG: 4 TABLET, ORALLY DISINTEGRATING ORAL at 06:59

## 2017-11-05 RX ADMIN — PROCHLORPERAZINE EDISYLATE 5 MG: 5 INJECTION INTRAMUSCULAR; INTRAVENOUS at 10:03

## 2017-11-05 RX ADMIN — BUPRENORPHINE HCL 4 MG: 2 TABLET SUBLINGUAL at 20:59

## 2017-11-05 ASSESSMENT — ACTIVITIES OF DAILY LIVING (ADL)
GROOMING: INDEPENDENT
ORAL_HYGIENE: INDEPENDENT
DRESS: INDEPENDENT
LAUNDRY: WITH SUPERVISION

## 2017-11-05 NOTE — PLAN OF CARE
Problem: Substance Withdrawal  Goal: Substance Withdrawal  Signs and symptoms of listed problems will be absent or manageable.   Outcome: No Change  Patient came to desk prior to breakfast complaining of nausea. Patient given Compazine 5 mg orally. Buprenorphine given approximately 25 minutes later. Patient reported that the Buprenorphine had dissolved sublingually. Approximately an hour later patient had a large yellow emesis after drinking a ginger ale and appeared to have thrown up part of oral Compazine. Order obtained for Compazine 5-10 mg IM and patient was given a 5 mg injection in his left Gluteus Pranay. He has not been able to eat at all today. Patient's opiate assessment scores were 8/3 and he continues on Buprenorphine 4 mg bid. Patient's benzodiazepine scores were 3/11. Patient has completed his Phenobarbital and will discontinue his Mssa.  Patient had planned to be discharged today, however he did agree to stay as he is not feeling well. Patient denies any depression or anxiety. No thoughts of self harm, no thoughts of harming others and no hallucinations.  Hopefully patient can proceed with discharge tomorrow.

## 2017-11-06 VITALS
TEMPERATURE: 97.3 F | WEIGHT: 157 LBS | BODY MASS INDEX: 21.26 KG/M2 | OXYGEN SATURATION: 99 % | DIASTOLIC BLOOD PRESSURE: 66 MMHG | HEART RATE: 61 BPM | SYSTOLIC BLOOD PRESSURE: 99 MMHG | HEIGHT: 72 IN | RESPIRATION RATE: 16 BRPM

## 2017-11-06 LAB — HCV AB SERPL QL IA: NONREACTIVE

## 2017-11-06 PROCEDURE — 25000132 ZZH RX MED GY IP 250 OP 250 PS 637: Performed by: INTERNAL MEDICINE

## 2017-11-06 PROCEDURE — 99238 HOSP IP/OBS DSCHRG MGMT 30/<: CPT | Performed by: PSYCHIATRY & NEUROLOGY

## 2017-11-06 PROCEDURE — 25000132 ZZH RX MED GY IP 250 OP 250 PS 637: Performed by: PSYCHIATRY & NEUROLOGY

## 2017-11-06 RX ADMIN — BUPRENORPHINE HCL 4 MG: 2 TABLET SUBLINGUAL at 08:21

## 2017-11-06 RX ADMIN — PROCHLORPERAZINE MALEATE 5 MG: 5 TABLET, FILM COATED ORAL at 07:46

## 2017-11-06 ASSESSMENT — ACTIVITIES OF DAILY LIVING (ADL)
ORAL_HYGIENE: INDEPENDENT
LAUNDRY: WITH SUPERVISION
DRESS: INDEPENDENT
GROOMING: INDEPENDENT

## 2017-11-06 NOTE — PLAN OF CARE
Problem: Substance Withdrawal  Goal: Substance Withdrawal  Signs and symptoms of listed problems will be absent or manageable.   Outcome: Adequate for Discharge Date Met:  11/06/17  Patient's opiate assessment score was 3 and he received Buprenorphine 4 mg this morning. He complained of nausea and was given Compazine 5 mg orally. He has had no further emesis today. Patient prepared for discharge.

## 2017-11-06 NOTE — DISCHARGE SUMMARY
Addiction Medicine Discharge Summary    Elvin Goff MRN# [id number]   Age: 27 year old YOB: 1990     Date of Admission:  11/1/2017  Date of Discharge::  11/6/2017 12:54 PM  Admitting Physician:  Dakota Alatorre MD  Discharge Physician:  Dakota Alatorre MD     Home clinic:              [place], encouraged to establish at [facility]          Admission Diagnoses:   Principal Problem:    Sedative withdrawal (H)  Active Problems:    Opioid use disorder, severe, dependence (H)    Opioid dependence with withdrawal (H)    Severe sedative, hypnotic, or anxiolytic use disorder (H)    Anxiety disorder, unspecified type          Discharge Diagnosis:   Principal Problem:    Sedative withdrawal (H), resolved without complication  Active Problems:    Opioid use disorder, severe, dependence (H), plans maintenance therapy    Opioid dependence with withdrawal (H), resolved with buprenorphine induction    Severe sedative, hypnotic, or anxiolytic use disorder (H)    Moderate cannabis use disorder    Anxiety disorder, unspecified type    Viral gastroenteritis, resolved          Procedures:   All laboratory data reviewed  Results for orders placed or performed during the hospital encounter of 11/01/17   Drug abuse screen 6 urine (tox)   Result Value Ref Range    Amphetamine Qual Urine Negative NEG^Negative    Barbiturates Qual Urine Negative NEG^Negative    Benzodiazepine Qual Urine Positive (A) NEG^Negative    Cannabinoids Qual Urine Positive (A) NEG^Negative    Cocaine Qual Urine Negative NEG^Negative    Ethanol Qual Urine Negative NEG^Negative    Opiates Qualitative Urine Positive (A) NEG^Negative   CBC with platelets   Result Value Ref Range    WBC 9.1 4.0 - 11.0 10e9/L    RBC Count 4.71 4.4 - 5.9 10e12/L    Hemoglobin 14.4 13.3 - 17.7 g/dL    Hematocrit 42.7 40.0 - 53.0 %    MCV 91 78 - 100 fl    MCH 30.6 26.5 - 33.0 pg    MCHC 33.7 31.5 - 36.5 g/dL    RDW 12.0 10.0 - 15.0 %    Platelet Count 167 150 - 450  "10e9/L   Comprehensive metabolic panel   Result Value Ref Range    Sodium 143 133 - 144 mmol/L    Potassium 3.8 3.4 - 5.3 mmol/L    Chloride 107 94 - 109 mmol/L    Carbon Dioxide 30 20 - 32 mmol/L    Anion Gap 6 3 - 14 mmol/L    Glucose 105 (H) 70 - 99 mg/dL    Urea Nitrogen 17 7 - 30 mg/dL    Creatinine 0.73 0.66 - 1.25 mg/dL    GFR Estimate >90 >60 mL/min/1.7m2    GFR Estimate If Black >90 >60 mL/min/1.7m2    Calcium 9.4 8.5 - 10.1 mg/dL    Bilirubin Total 0.6 0.2 - 1.3 mg/dL    Albumin 3.9 3.4 - 5.0 g/dL    Protein Total 7.0 6.8 - 8.8 g/dL    Alkaline Phosphatase 87 40 - 150 U/L    ALT 22 0 - 70 U/L    AST 15 0 - 45 U/L   HIV Antigen Antibody Combo   Result Value Ref Range    HIV Antigen Antibody Combo Nonreactive NR^Nonreactive       Hepatitis B core antibody   Result Value Ref Range    Hepatitis B Core Lorie Nonreactive NR^Nonreactive   Hepatitis B surface antigen   Result Value Ref Range    Hep B Surface Agn Nonreactive NR^Nonreactive   Hep C antibody negative/NR.          Medications Prior to Admission:     No prescriptions prior to admission.       Found with small supply of packets labeled \"etizolam\" on his person at admission.     [facility] query result:  Indicates no controlled prescriptions reported in previous 12 months.           Discharge Medications:     Discharge Medication List as of 11/6/2017 12:23 PM      START taking these medications    Details   cloNIDine (CATAPRES-TTS1) 0.1 MG/24HR WK patch Place 1 patch onto the skin once a week, Disp-4 patch, R-0, E-Prescribe      gabapentin (NEURONTIN) 300 MG capsule Take 1 capsule (300 mg) by mouth 3 times daily, Disp-90 capsule, R-0, E-Prescribe      naloxone (NARCAN) 1 mg/mL for intranasal kit (2 syringes with 2 mucosal atomizer device) In opioid overdose put cone in nostril and push 1/2 of contents into each nostril.  Repeat every 3 min if no response until help arrives., Disp-1 kit, R-1, E-PrescribeFor intranasal administration: Dispense 2 naloxone " injection 2 mg/2 mL syringes.  Incl ude 2 mucosal atomization devices (MAD-Nasal).                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 Reports that he was recently laid off from his job of [employer] which is somewhat due to seasonal changes and also probably somewhat from substance use. Since he hasn't been working, has been drinking at home which he thinks was concerning to his mother since he's been passed out at home quite a bit. Reports using heroin daily for about a year at this point and has used for about 10 years in total. 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 Elvin has been back in [place] for about a year and a half, lives with his mom and older brother in [place]. Additionally, has been using benzodiazepines recently and reports either using up to 4 mg daily of alprazolam or 5-10 mg daily of etizolam. Buys both of these online. Drinks beer until he blacks out. Reports only drinking beer after having been diagnosed with gastritis on a few occasions after drinking liquor. Last use of heroin was around midnight last night, had been using about half gram daily, mixed smoked and IV. Last IV use 3 days PTA. Got first dose of buprenorphine this morning and feels it is helping manage symptoms of withdrawal he was experiencing (nausea, insomnia, rhinorrhea, watery eyes). At this point, he is unsure of how he'd like to proceed but is willing to stay until tomorrow. May be interested in methadone maintenance and is willing to hear about various treatment options but currently somewhat hesitant. Of  "note, reports history of significant social anxiety for which substances seem to help manage. Has a history of panic attack in the context of withdrawal and reports low mood for as long as he can remember. Denies h/o thoughts of suicide. In past week had some ulnar neuropathy symptoms after passing out while intoxicated. Strength is improved in this (right) hand. Missed vein on dorsum of left had within the past week - site healed. Is admitted voluntarily.          Hospital Course:   Sedative withdrawal, which was not anticipated to be significant, was addressed with brief phenobarbital taper, which was well-tolerated. Opioid withdrawal was managed with buprenorphine induction and adjunctive clonidine/gabapentin. Patient was very ambivalent about treatment options, including medication. He felt better once buprenorphine was initiated, decided to stay to stabilize on medication and to pursue maintenance treatment and other outpatient resources. Course complicated by brief self-resolving GI illness, presumed to be viral.      Discharge ROS:  Complete ROS performed and is negative    Discharge Exam:  Temp: 97.3  F (36.3  C) Temp src: Oral BP: 99/66 Pulse: 61   Resp: 16        General: tired-appearing but alert, no distress  Eyes: anicteric, no injection, PERRL  ENT: no rhinorrhea, mmm  Lungs: normal WOB, breath sounds clear  CV: well-perfused, no murmur, no edema  Neuro: no tremor, normal tone, gait and coordination intact  Skin: injection sites without infectious signs, no flushing, no jaundice    MENTAL STATUS EXAM  Appearance: casual street attire, fair grooming  Attitude: more engageable, cooperative  Behavior: no agitation or slowing  Eye Contact: intermittent  Speech: coherent, no pressuring  Orientation: oriented to person , place, time and situation  Mood:  \"better\"  Affect: less irritability, still some anxious tension, more range  Thought Process: goal-directed, no FOI or BARRY  Suicidal Ideation: denies " thought/intent/plan  Hallucination: denies  Insight: partial, capable of improvement  Judgment: adequate for safety         Discharge Instructions and Follow-Up:   Discharge diet: Regular   Discharge activity: Activity as tolerated   Discharge follow-up: See AVS; encouraged to establish PCP           Discharge Disposition:   Discharged to self/home with plan for Rule 25 at [facility], in the meantime will attend intake at [facility] for continued buprenorphine      Attestation:  I have reviewed today's vital signs, notes, medications, and labs/studies pertinent to this encounter.    Dakota Alatorre MD  Pediatrics/Addiction Medicine

## 2017-11-06 NOTE — PROGRESS NOTES
Pt completed phone screen and will be doing Suboxone maintenance with [facility] for 1 month prior to transferring Suboxone maintenance to [facility]. Pt has Rule 25 CD appointment scheduled for 11/7 with [facility].  Pt will d/c today, 11/6. AVS complete.

## 2017-11-06 NOTE — PLAN OF CARE
Problem: General Plan of Care (Inpatient Behavioral)  Goal: Discharge Planning  Outcome: Adequate for Discharge Date Met:  11/06/17  Patient and I met to review discharge instructions and discharge medications. His lab results were reviewed and he was given a copy of those results. He denies any thoughts of self harm, denies thoughts of harming others and denies hallucinations. Patient was able to identify a small support network and several coping skills. Patient has a appointment at [facility] tomorrow 11/7/17 @ 8:00 AM for Suboxone maintenance and a appointment at [facility] tomorrow 11/7/17 @ 1:00 PM for a chemical health assessment. Patient discharged with all belongings and valuables to home. The benzodiazepines he brought in from home were kept in Security for destruction and patient was agreement.

## 2017-11-06 NOTE — DISCHARGE INSTRUCTIONS
Behavioral [place] Discharge Planning and Instructions  THANK YOU FOR CHOOSING THE [facility], HEALTH  [place] [facility]: [phone]    Summary: You were admitted to [address] on November 1, 2017 for detoxification from heroin, sedative hypnotics and alcohol. Your withdrawal is stable and you are being discharged today.  A medical examination was performed that included lab work. You have met with a  and opted to trial Suboxone maintenance @ [facility] on November 7, 2017. You have a Chemical Dependency assessment scheduled at [facility] on Tuesday November 7, 2017 @ 1:00 PM. Your appointment is listed below.  Please make your recovery a priority, Elvin.     Main Diagnosis:  Polysubstance Dependence    Major Treatments, Procedures and Findings:  You were detoxified from opiates using Buprenorphine. Your Benzodiazepine withdrawal was managed with Phenobarbital. You have had a chemical dependency assessment.  You have had blood drawn, and the results have been reviewed with you.  Please take a copy of your laboratory work with you to your next provider appointment.    Symptoms to Report:  If you experience more anxiety, confusion, sleeplessness, deep sadness or thoughts of suicide, notify your treatment team or notify your primary care physician. IF THE SYMPTOMS YOU ARE EXPERIENCING ARE A MEDICAL EMERGENCY, CALL 911 IMMEDIATELY.     Lifestyle Adjustment: Adjust your lifestyle to get enough sleep, relaxation, exercise and excellent nutrition. Continue to develop healthy coping skills to decrease stress and promote a sober living environment. Do not use alcohol, illegal drugs or addictive medications other than what is currently prescribed. AA, NA, and a sponsor are excellent resources for support. There has been a recent increase in opioid overdoses and deaths. After even a short period of no drug use your tolerance level with be lowered. It is unsafe to believe you can use the same  amount of drugs that you did prior to this period of no drug use. Returning to your drug using environment and contact with your drug using friends will put you at a high risk of relapsing.     Suboxone Maintenance Follow-Up:  Date and Time: November 7, 2017 @ 8:00 AM  Provider  [facility]  [address] [place] [address]  [phone]    Chemical Dependency Evaluation Follow-Up:  Date and Time: Tuesday November 7th at 1:00 pm  Provider: Tamar ASCENCIO  [address]  [place] [address]  [phone]    Primary Provider Follow Up-follow up if nausea and vomiting continue  [facility]  [address] [place] [address]  Phone: [phone]    Resources:  [facility] [phone] Support Group:  AA/NA and Sponsor/support.  Crisis Intervention: 542.173.2533 or 082-320-8687 (TTY: 497.376.6730). Call anytime for help.  [employer] on Mental Illness (www.[place].salma.org): 879.542.9955 or 091-620-8019.  Alcoholics Anonymous (www.alcoholics-anonymous.org): Check your phone book for your local chapter.  Suicide Awareness Voices of Education (www.save.org): 368.474.2168  National Suicide Prevention Line (www.mentalhealthmn.org): 020-389-URHJ (9482)  Mental Health Consumer/Survivor Network of [place] (www.mhcsn.net): 718.918.9074 or 959-924-8411.  Mental Health Association of [place] (www.mentalhealth.org): 481.702.1242 or 130-492-8603  Substance Abuse and Mental Health Services. (www.samhsa.gov)    [place] Recovery Connection ([facility])  [facility] connects people seeking recovery to resources that help foster and sustain long-term recovery.  Whether you are seeking resources for treatment, transportation, housing, job training, education, health care or other pathways to recovery, [facility] is a great place to start. 571.866.8867 www.[facility].org    General Medication Instruction: See your medication papers for instructions. Take all medicines as directed.  Make no  changes unless your doctor suggests them. Go to all your doctor visits.  Be sure to have all your required lab tests. This way, your medicines may be refilled on time. Do not use any drugs not prescribed by your provider. AA/NA and sponsors are excellent resources for support. Avoid alcohol at all costs!    Please Note:  If you have any questions at anytime after you are discharged please call the [facility], [place] detoxification [facility] [address] at [phone].  [facility], Behavioral Intake [phone]. Please take this discharge folder with you to all your follow up appointments, it contains your lab results, diagnosis, medication list and discharge recommendations. THANK YOU FOR CHOOSING THE [facility]

## 2017-12-08 ENCOUNTER — HOSPITAL ENCOUNTER (EMERGENCY)
Facility: CLINIC | Age: 27
Discharge: OTHER INSTITUTION | End: 2017-12-08
Attending: EMERGENCY MEDICINE | Admitting: EMERGENCY MEDICINE
Payer: COMMERCIAL

## 2017-12-08 VITALS
HEIGHT: 71 IN | OXYGEN SATURATION: 96 % | DIASTOLIC BLOOD PRESSURE: 90 MMHG | SYSTOLIC BLOOD PRESSURE: 124 MMHG | RESPIRATION RATE: 17 BRPM | TEMPERATURE: 99.4 F

## 2017-12-08 DIAGNOSIS — F11.10 HEROIN ABUSE (H): ICD-10-CM

## 2017-12-08 PROCEDURE — 99285 EMERGENCY DEPT VISIT HI MDM: CPT

## 2017-12-08 ASSESSMENT — ENCOUNTER SYMPTOMS
FATIGUE: 1
ABDOMINAL PAIN: 0
SHORTNESS OF BREATH: 0

## 2017-12-08 NOTE — ED PROVIDER NOTES
History     Chief Complaint:  Drug Overdose     HPI   Elvin Goff is a 27 year old male who presents for evaluation of a drug overdose. The patient reports that he has been using heroin daily for about the past two years. The patient had previously been on Suboxone but stopped using it on 12/4/2017. Today, the patient smoked and injected heroin, last around 10 and 11 in the morning. His brother had difficulty waking him up and brought him into the ED with concern that he could have overdosed. Currently, the patient reports that he would like help quitting heroin and may be interested in inpatient detox. He denies any alcohol use today. He does also smoke marijuana. He reports that he is feeling tired but otherwise denies any chest pain, shortness of breath, or abdominal pain. He has previously been prescribed Gabapentin and Clonidine, but he has not been taking these medications recently.     Allergies:  NKDA      Medications:    cloNIDine (CATAPRES-TTS1) 0.1 MG/24HR WK patch  gabapentin (NEURONTIN) 300 MG capsule  naloxone (NARCAN) 1 mg/mL for intranasal kit (2 syringes with 2 mucosal atomizer device)    Past Medical History:    Substance abuse  Anxiety  Opioid dependence with withdrawal     Past Surgical History:    History reviewed. No pertinent past surgical history.     Family History:    History reviewed. No pertinent family history.     Social History:  Tobacco use:    Current every day smoker  Alcohol use:    Positive  Drug use:    Opiates, cocaine, and marijuana  Marital status:    Single   Accompanied to ED by:  Alone, driven by brother      Review of Systems   Constitutional: Positive for fatigue.   Respiratory: Negative for shortness of breath.    Cardiovascular: Negative for chest pain.   Gastrointestinal: Negative for abdominal pain.   Psychiatric/Behavioral:        (+) Heroin use   All other systems reviewed and are negative.      Physical Exam   First Vitals:  BP: 149/82  Heart Rate:  "93  Temp: 99.4  F (37.4  C)  Resp: 14  Height: 180.3 cm (5' 11\")  SpO2: 98 %      Physical Exam  General: Patient in mild distress.  Alert and cooperative with exam. Normal mentation  HEENT: NC/AT. Conjunctiva without injection or scleral icterus. External ears normal.  Respiratory: Breathing comfortably on room air  CV: Normal rate, all extremities well perfused  GI:  Non-distended abdomen  Skin: Warm, dry, track marks right upper extremity, no evidence of infection   Musculoskeletal: No obvious deformities  Neuro: Alert, answers questions appropriately. No gross motor deficits    Emergency Department Course     Emergency Department Course:  Nursing notes and vitals reviewed.  1651: I performed an exam of the patient as documented above.     1810: I updated and reassessed the patient.     Findings and plan explained to the Patient. Patient will be transferred to [address] [place] [address] Detox via EMS.    Impression & Plan      Medical Decision Making:  Elvin Goff is a 27 year old male who presents with report of heroin abuse, last used earlier today. Additionally, he notes abuse of benzodiazepines and marijuana. At the time of evaluation, the patient was awake, alert, and responding appropriately. Vital signs in normal range. Denies current complaints other than fatigue. No indication at this time for further labs or imaging. No evidence of abscess or skin infection. The patient expresses desire to get clean. Arrangements were made for the patient to be transported to [address] [place] [address] for heroin detox. The patient remained stable throughout ED course and was transferred to [address] [place] [address].     Diagnosis:    ICD-10-CM   1. Heroin abuse F11.10       Disposition:  Transfer to [address] [place] [address].       IRamirez, am serving as a scribe at 4:51 PM on 12/8/2017 to document services personally performed by Joel Steiner DO based on my observations and the provider's statements to me.       EMERGENCY DEPARTMENT     "   Obdulia, Joel Valenzuela, DO  12/08/17 2115

## 2017-12-12 ENCOUNTER — HOSPITAL ENCOUNTER (EMERGENCY)
Facility: CLINIC | Age: 27
Discharge: PSYCHIATRIC OR SUBSTANCE USE FACILITY | End: 2017-12-12
Attending: EMERGENCY MEDICINE | Admitting: EMERGENCY MEDICINE
Payer: COMMERCIAL

## 2017-12-12 VITALS
HEART RATE: 74 BPM | TEMPERATURE: 97.8 F | WEIGHT: 155 LBS | RESPIRATION RATE: 16 BRPM | BODY MASS INDEX: 21.7 KG/M2 | HEIGHT: 71 IN | DIASTOLIC BLOOD PRESSURE: 73 MMHG | OXYGEN SATURATION: 100 % | SYSTOLIC BLOOD PRESSURE: 115 MMHG

## 2017-12-12 DIAGNOSIS — F19.10 POLYSUBSTANCE ABUSE (H): ICD-10-CM

## 2017-12-12 PROCEDURE — 90791 PSYCH DIAGNOSTIC EVALUATION: CPT

## 2017-12-12 PROCEDURE — 99285 EMERGENCY DEPT VISIT HI MDM: CPT | Mod: 25

## 2017-12-12 ASSESSMENT — ENCOUNTER SYMPTOMS
WOUND: 0
FEVER: 0
AGITATION: 1

## 2017-12-12 NOTE — ED PROVIDER NOTES
History     Chief Complaint:  Drug Abse and Possible Suicidal Ideation    HPI   Elvin Goff is a 27 year old male who presents via EMS for evaluation of drug abuse and possible suicidal ideation. The patient was seen here 4 days ago because his brother had difficulty waking him up after the patient used heroine. He stated then that he would be interested in inpatient detox. The patient was transferred to [address] [place] [address] and after getting out, the patient used crack the following day. He also used heroine yesterday and benzos today. The patient did take his prescribed Gabapentin and Clonidine last night, but reports not taking these medications recently. The patient's mother states that she heard the patient say that he was going to cut himself with a knife today. However, the patient denies this entirely, stating that she must have misheard him. He also denies any recent suicidal or homicidal ideation and has never performed any acts of self-harm, such as cutting. Of note, the patient states he has a bed ready for him at a detox facility in [place] and says he would have gone there today if his parents did not call EMS. The patient states that he would still go there today if they still have a bed for him.     Allergies:  NKDA    Medications:    Clonidine  Neurontin  Narcan    Past Medical History:    Substance abuse  Opioid use disorder, severe  Opioid dependence with withdrawal  Sedative withdrawal  Anxiety disorder    Past Surgical History:    The patient does not have any pertinent past surgical history.    Family History:    No past pertinent family history.    Social History:  Current every day smoker  Alcohol Use: 3-4 times per week  Marital Status:  Single     Review of Systems   Constitutional: Negative for fever.   Skin: Negative for wound.   Psychiatric/Behavioral: Positive for agitation. Negative for self-injury and suicidal ideas.   All other systems reviewed and are  "negative.      Physical Exam     Patient Vitals for the past 24 hrs:   BP Temp Pulse Resp SpO2 Height Weight   12/12/17 1404 115/73 97.8  F (36.6  C) 74 16 100 % 1.803 m (5' 11\") 70.3 kg (155 lb)       Physical Exam  General: Well-nourished  Eyes: PERRL, conjunctivae pink no scleral icterus or conjunctival injection  ENT:  Moist mucus membranes, posterior oropharynx clear without erythema or exudates  Respiratory:  Lungs clear to auscultation bilaterally, no crackles/rubs/wheezes.  Good air movement  CV: Normal rate and rhythm, no murmurs/rubs/gallops  GI:  Abdomen soft and non-distended.  Normoactive BS.  No tenderness, guarding or rebound  Skin: Warm, dry.  No rashes or petechiae  Musculoskeletal: No peripheral edema or calf tenderness  Neuro: Alert and oriented to person/place/time  Psychiatric: Intoxicated affect    Emergency Department Course     Emergency Department Course:  Patient arrived via EMS.  Nursing notes and vitals reviewed.  I performed an exam of the patient as documented above.   Patient was transferred to detox program in [facility].     Impression & Plan      Medical Decision Making:  Elvin Goff is a 27 year old male with a known history of polysubstance abuse including opiates and benzodiazepines. He was scheduled to go to rehab and was delaying and apparently made some comments to his mom that he would cut himself. He is alert and able to answer questions. He is adamantly denying any intent for self harm or to cut himself. He is willing to go to rehab in the treatment location in [facility]. He was evaluated by DEC radha Goodman who concurred that he was not suicidal, homicidal or a danger to himself at this time. Fortunately he actually has inpatient rehabilitation set up at this program in [facility]. Maxine contacted them and confirmed this. The brother confirmed this as well and they are willing to send a  to pick him up and take them there. So he is medically cleared and he " will go to his rehabilitation program with the hopes that it will provide assistance to him.    Diagnosis:    ICD-10-CM    1. Polysubstance abuse F19.10        Disposition:  Transferred to rehabilitation facility in [facility]    Discharge Medications:  New Prescriptions    No medications on file       Lai VICKERS, am serving as a scribe on 12/12/2017 at 2:30 PM to personally document services performed by Steff Carney MD based on my observations and the provider's statements to me.     12/12/2017    EMERGENCY DEPARTMENT       Steff Carney MD  12/12/17 5950

## 2017-12-12 NOTE — ED NOTES
Pt states he used crack on Sunday, benzos today, used heroine yesterday. Took clonopin and gabapentin last night and wine, pt states no thoughts to harm himself. Pt states his mom heard him wrong about cutting himself, pt denies saying this.

## 2017-12-12 NOTE — DISCHARGE INSTRUCTIONS
*Report to the drug treatment program in [facility].  *No new medications.   *Return if you develop thoughts of wanting to hurt yourself or become worse in any way.          Recovering from Addiction: Coping with Relapse  Drug and alcohol abuse changes the brain in ways that continue long after the abuse ends. Because of this, people who are addicted to drugs or alcohol are at risk for relapse. This is true even after long periods of staying drug- or alcohol-free. Like other chronic diseases, substance addiction needs to be managed daily. A person who is addicted to drugs or alcohol needs a plan to help prevent, or manage, a relapse.  You will need ongoing treatment and support. Your best chance for long-term recovery will likely be a combination of medicines and behavioral therapy. Other tips include:  Stick with your treatment plan. It may seem like you've recovered and you don't need to keep taking steps to stay drug- or alcohol-free. Your chances of staying sober are much higher if you continue treatment after you recover. If you are thinking about stopping treatment, talk to a professional first.  Get help immediately if you use the drug again. If you start using again, talk with your healthcare provider or someone else who can help you right away.  Get loved ones involved in your recovery. A form of therapy called community reinforcement and family training focuses on counseling and training for your family. The therapist teaches your family how to help motivate you to seek treatment. This therapy also helps the family recognize situations that may lead you to drink or use drugs. Other family oriented recovery programs, such as Alcoholics Anonymous and Al-Anon, are available in communities nationwide.  Learn healthy ways to cope with stress, anger, boredom, or other triggers. Behavioral therapy can help you learn ways for coping with drug or alcohol cravings. It can also teach you ways to avoid drugs and prevent  relapse, and help you deal with relapse if it occurs.   Date Last Reviewed: 2/1/2017 2000-2017 [employer]. [address] [place] [address], [place] [address]. All rights reserved. This information is not intended as a substitute for professional medical care. Always follow your healthcare professional's instructions.

## 2017-12-12 NOTE — ED NOTES
EDT and RN walked patient out to van where person from [facility] was waiting. Patient willingly went with to treatment.

## 2017-12-12 NOTE — ED AVS SNAPSHOT
Emergency Department    [address] [facility] [address]    Phone:  [phone]    Fax:  [phone]                                       Elvin Goff   MRN: [id number]    Department:   Emergency Department   Date of Visit:  12/12/2017           After Visit Summary Signature Page     I have received my discharge instructions, and my questions have been answered. I have discussed any challenges I see with this plan with the nurse or doctor.    ..........................................................................................................................................  Patient/Patient Representative Signature      ..........................................................................................................................................  Patient Representative Print Name and Relationship to Patient    ..................................................               ................................................  Date                                            Time    ..........................................................................................................................................  Reviewed by Signature/Title    ...................................................              ..............................................  Date                                                            Time

## 2017-12-12 NOTE — ED AVS SNAPSHOT
Emergency Department    [address] [place] [address]    Phone:  [phone]    Fax:  [phone]                                       Elvin Goff   MRN: [id number]    Department:   Emergency Department   Date of Visit:  12/12/2017           Patient Information     Date Of Birth          1990        Your diagnoses for this visit were:     Polysubstance abuse        You were seen by Steff Carney MD.      Follow-up Information     Follow up with go directly to your rehab program.        Discharge Instructions       *Report to the drug treatment program in [facility].  *No new medications.   *Return if you develop thoughts of wanting to hurt yourself or become worse in any way.          Recovering from Addiction: Coping with Relapse  Drug and alcohol abuse changes the brain in ways that continue long after the abuse ends. Because of this, people who are addicted to drugs or alcohol are at risk for relapse. This is true even after long periods of staying drug- or alcohol-free. Like other chronic diseases, substance addiction needs to be managed daily. A person who is addicted to drugs or alcohol needs a plan to help prevent, or manage, a relapse.  You will need ongoing treatment and support. Your best chance for long-term recovery will likely be a combination of medicines and behavioral therapy. Other tips include:  Stick with your treatment plan. It may seem like you've recovered and you don't need to keep taking steps to stay drug- or alcohol-free. Your chances of staying sober are much higher if you continue treatment after you recover. If you are thinking about stopping treatment, talk to a professional first.  Get help immediately if you use the drug again. If you start using again, talk with your healthcare provider or someone else who can help you right away.  Get loved ones involved in your recovery. A form of therapy called community reinforcement and family training focuses  on counseling and training for your family. The therapist teaches your family how to help motivate you to seek treatment. This therapy also helps the family recognize situations that may lead you to drink or use drugs. Other family oriented recovery programs, such as Alcoholics Anonymous and Al-Anon, are available in communities nationwide.  Learn healthy ways to cope with stress, anger, boredom, or other triggers. Behavioral therapy can help you learn ways for coping with drug or alcohol cravings. It can also teach you ways to avoid drugs and prevent relapse, and help you deal with relapse if it occurs.   Date Last Reviewed: 2/1/2017 2000-2017 [employer]. [address] [place] [address], [place] [address]. All rights reserved. This information is not intended as a substitute for professional medical care. Always follow your healthcare professional's instructions.                24 Hour Appointment Hotline       To make an appointment at any [facility], call [phone] ([phone]). If you don't have a family doctor or clinic, we will help you find one. [facility] clinics are conveniently located to serve the needs of you and your family.             Review of your medicines      Our records show that you are taking the medicines listed below. If these are incorrect, please call your family doctor or clinic.        Dose / Directions Last dose taken    cloNIDine 0.1 MG/24HR WK patch   Commonly known as:  CATAPRES-TTS1   Dose:  1 patch   Quantity:  4 patch        Place 1 patch onto the skin once a week   Refills:  0        gabapentin 300 MG capsule   Commonly known as:  NEURONTIN   Dose:  300 mg   Quantity:  90 capsule        Take 1 capsule (300 mg) by mouth 3 times daily   Refills:  0        naloxone 1 mg/mL for intranasal kit (2 syringes with 2 mucosal atomizer device)   Commonly known as:  NARCAN   Quantity:  1 kit        In opioid overdose put cone in nostril and push 1/2 of contents into each  nostril.  Repeat every 3 min if no response until help arrives.   Refills:  1                Orders Needing Specimen Collection     None      Pending Results     No orders found from 12/10/2017 to 12/13/2017.            Pending Culture Results     No orders found from 12/10/2017 to 12/13/2017.            Pending Results Instructions     If you had any lab results that were not finalized at the time of your Discharge, you can call the ED Lab Result RN at [phone]. You will be contacted by this team for any positive Lab results or changes in treatment. The nurses are available 7 days a week from 10A to 6:30P.  You can leave a message 24 hours per day and they will return your call.        Test Results From Your Hospital Stay               Clinical Quality Measure: Blood Pressure Screening     Your blood pressure was checked while you were in the emergency department today. The last reading we obtained was  BP: 115/73 . Please read the guidelines below about what these numbers mean and what you should do about them.  If your systolic blood pressure (the top number) is less than 120 and your diastolic blood pressure (the bottom number) is less than 80, then your blood pressure is normal. There is nothing more that you need to do about it.  If your systolic blood pressure (the top number) is 120-139 or your diastolic blood pressure (the bottom number) is 80-89, your blood pressure may be higher than it should be. You should have your blood pressure rechecked within a year by a primary care provider.  If your systolic blood pressure (the top number) is 140 or greater or your diastolic blood pressure (the bottom number) is 90 or greater, you may have high blood pressure. High blood pressure is treatable, but if left untreated over time it can put you at risk for heart attack, stroke, or kidney failure. You should have your blood pressure rechecked by a primary care provider within the next 4 weeks.  If your provider in  "the emergency department today gave you specific instructions to follow-up with your doctor or provider even sooner than that, you should follow that instruction and not wait for up to 4 weeks for your follow-up visit.        Thank you for choosing [facility]       Thank you for choosing [facility] for your care. Our goal is always to provide you with excellent care. Hearing back from our patients is one way we can continue to improve our services. Please take a few minutes to complete the written survey that you may receive in the mail after you visit with us. Thank you!        [employer] Information     [employer] lets you send messages to your doctor, view your test results, renew your prescriptions, schedule appointments and more. To sign up, go to [email or web address] . Click on \"Log in\" on the left side of the screen, which will take you to the Welcome page. Then click on \"Sign up Now\" on the right side of the page.     You will be asked to enter the access code listed below, as well as some personal information. Please follow the directions to create your username and password.     Your access code is: [email or web address]  Expires: 2018  7:50 AM     Your access code will  in 90 days. If you need help or a new code, please call your [facility] clinic or [phone].        Care EveryWhere ID     This is your Care EveryWhere ID. This could be used by other organizations to access your [facility] medical records  [id number]        Equal Access to Services     BRITTANI PRAKASH : Hadii librado Johnson, waaxda lucharisse, qaybta kaalmada arnold, johnny sanders . So [facility] [phone].    ATENCIÓN: Si habla español, tiene a jacques disposición servicios gratuitos de asistencia lingüística. Skye romeo [phone].    We comply with applicable federal civil rights laws and [place] laws. We do not discriminate on the basis of race, color, national origin, age, disability, sex, sexual orientation, " or gender identity.            After Visit Summary       This is your record. Keep this with you and show to your community pharmacist(s) and doctor(s) at your next visit.

## 2017-12-12 NOTE — ED NOTES
Bed: [facility]  Expected date: 12/12/17  Expected time: 1:42 PM  Means of arrival: Ambulance  Comments:  519 27M withdrawal, HOLD. [id number]

## 2018-01-01 ENCOUNTER — HOSPITAL ENCOUNTER (EMERGENCY)
Facility: CLINIC | Age: 28
Discharge: HOME | End: 2018-09-03
Attending: EMERGENCY MEDICINE | Admitting: EMERGENCY MEDICINE
Payer: COMMERCIAL

## 2018-01-01 ENCOUNTER — OFFICE VISIT (OUTPATIENT)
Dept: FAMILY MEDICINE | Facility: CLINIC | Age: 28
End: 2018-01-01
Payer: COMMERCIAL

## 2018-01-01 VITALS
RESPIRATION RATE: 20 BRPM | SYSTOLIC BLOOD PRESSURE: 120 MMHG | HEART RATE: 76 BPM | TEMPERATURE: 99.7 F | OXYGEN SATURATION: 97 % | DIASTOLIC BLOOD PRESSURE: 68 MMHG | HEIGHT: 71 IN | WEIGHT: 148 LBS | BODY MASS INDEX: 20.72 KG/M2

## 2018-01-01 VITALS
RESPIRATION RATE: 16 BRPM | BODY MASS INDEX: 21 KG/M2 | DIASTOLIC BLOOD PRESSURE: 84 MMHG | HEIGHT: 71 IN | OXYGEN SATURATION: 100 % | SYSTOLIC BLOOD PRESSURE: 131 MMHG | WEIGHT: 150 LBS | TEMPERATURE: 97.9 F

## 2018-01-01 DIAGNOSIS — F11.23 OPIOID DEPENDENCE WITH WITHDRAWAL (H): ICD-10-CM

## 2018-01-01 DIAGNOSIS — R19.7 NAUSEA VOMITING AND DIARRHEA: Primary | ICD-10-CM

## 2018-01-01 DIAGNOSIS — R11.2 NAUSEA AND VOMITING, INTRACTABILITY OF VOMITING NOT SPECIFIED, UNSPECIFIED VOMITING TYPE: ICD-10-CM

## 2018-01-01 DIAGNOSIS — F11.93 NARCOTIC WITHDRAWAL (H): ICD-10-CM

## 2018-01-01 DIAGNOSIS — F12.10 MARIJUANA ABUSE: ICD-10-CM

## 2018-01-01 DIAGNOSIS — R11.2 NAUSEA VOMITING AND DIARRHEA: Primary | ICD-10-CM

## 2018-01-01 DIAGNOSIS — R10.13 ABDOMINAL PAIN, EPIGASTRIC: Primary | ICD-10-CM

## 2018-01-01 LAB
ALBUMIN SERPL-MCNC: 3.7 G/DL (ref 3.4–5)
ALP SERPL-CCNC: 73 U/L (ref 40–150)
ALT SERPL W P-5'-P-CCNC: 14 U/L (ref 0–70)
AMYLASE SERPL-CCNC: 35 U/L (ref 30–110)
ANION GAP SERPL CALCULATED.3IONS-SCNC: 6 MMOL/L (ref 3–14)
ANION GAP SERPL CALCULATED.3IONS-SCNC: 7 MMOL/L (ref 3–14)
AST SERPL W P-5'-P-CCNC: 10 U/L (ref 0–45)
BILIRUB SERPL-MCNC: 0.4 MG/DL (ref 0.2–1.3)
BUN SERPL-MCNC: 14 MG/DL (ref 7–30)
BUN SERPL-MCNC: 19 MG/DL (ref 7–30)
CALCIUM SERPL-MCNC: 8.8 MG/DL (ref 8.5–10.1)
CALCIUM SERPL-MCNC: 8.9 MG/DL (ref 8.5–10.1)
CHLORIDE SERPL-SCNC: 102 MMOL/L (ref 94–109)
CHLORIDE SERPL-SCNC: 107 MMOL/L (ref 94–109)
CO2 SERPL-SCNC: 28 MMOL/L (ref 20–32)
CO2 SERPL-SCNC: 31 MMOL/L (ref 20–32)
CREAT SERPL-MCNC: 0.8 MG/DL (ref 0.66–1.25)
CREAT SERPL-MCNC: 0.85 MG/DL (ref 0.66–1.25)
ERYTHROCYTE [DISTWIDTH] IN BLOOD BY AUTOMATED COUNT: 12.6 % (ref 10–15)
GFR SERPL CREATININE-BSD FRML MDRD: >90 ML/MIN/1.7M2
GFR SERPL CREATININE-BSD FRML MDRD: >90 ML/MIN/1.7M2
GLUCOSE SERPL-MCNC: 103 MG/DL (ref 70–99)
GLUCOSE SERPL-MCNC: 91 MG/DL (ref 70–99)
HCT VFR BLD AUTO: 40.4 % (ref 40–53)
HGB BLD-MCNC: 13.5 G/DL (ref 13.3–17.7)
LIPASE SERPL-CCNC: 92 U/L (ref 73–393)
MCH RBC QN AUTO: 30.5 PG (ref 26.5–33)
MCHC RBC AUTO-ENTMCNC: 33.4 G/DL (ref 31.5–36.5)
MCV RBC AUTO: 91 FL (ref 78–100)
PLATELET # BLD AUTO: 227 10E9/L (ref 150–450)
POTASSIUM SERPL-SCNC: 4 MMOL/L (ref 3.4–5.3)
POTASSIUM SERPL-SCNC: 4.4 MMOL/L (ref 3.4–5.3)
PROT SERPL-MCNC: 7.3 G/DL (ref 6.8–8.8)
RBC # BLD AUTO: 4.43 10E12/L (ref 4.4–5.9)
SODIUM SERPL-SCNC: 140 MMOL/L (ref 133–144)
SODIUM SERPL-SCNC: 141 MMOL/L (ref 133–144)
WBC # BLD AUTO: 10.1 10E9/L (ref 4–11)

## 2018-01-01 PROCEDURE — 99284 EMERGENCY DEPT VISIT MOD MDM: CPT | Mod: 25

## 2018-01-01 PROCEDURE — 36415 COLL VENOUS BLD VENIPUNCTURE: CPT | Performed by: FAMILY MEDICINE

## 2018-01-01 PROCEDURE — 80048 BASIC METABOLIC PNL TOTAL CA: CPT | Performed by: EMERGENCY MEDICINE

## 2018-01-01 PROCEDURE — 82150 ASSAY OF AMYLASE: CPT | Performed by: FAMILY MEDICINE

## 2018-01-01 PROCEDURE — 25000128 H RX IP 250 OP 636: Performed by: EMERGENCY MEDICINE

## 2018-01-01 PROCEDURE — 80053 COMPREHEN METABOLIC PANEL: CPT | Performed by: FAMILY MEDICINE

## 2018-01-01 PROCEDURE — 83690 ASSAY OF LIPASE: CPT | Performed by: FAMILY MEDICINE

## 2018-01-01 PROCEDURE — 85027 COMPLETE CBC AUTOMATED: CPT | Performed by: FAMILY MEDICINE

## 2018-01-01 PROCEDURE — 96374 THER/PROPH/DIAG INJ IV PUSH: CPT

## 2018-01-01 PROCEDURE — 96361 HYDRATE IV INFUSION ADD-ON: CPT

## 2018-01-01 PROCEDURE — 99214 OFFICE O/P EST MOD 30 MIN: CPT | Performed by: FAMILY MEDICINE

## 2018-01-01 RX ORDER — TRAZODONE HYDROCHLORIDE 50 MG/1
50 TABLET, FILM COATED ORAL
COMMUNITY

## 2018-01-01 RX ORDER — ONDANSETRON 2 MG/ML
4 INJECTION INTRAMUSCULAR; INTRAVENOUS ONCE
Status: COMPLETED | OUTPATIENT
Start: 2018-01-01 | End: 2018-01-01

## 2018-01-01 RX ORDER — ONDANSETRON 4 MG/1
4 TABLET, ORALLY DISINTEGRATING ORAL EVERY 6 HOURS PRN
Qty: 20 TABLET | Refills: 0 | Status: SHIPPED | OUTPATIENT
Start: 2018-01-01 | End: 2018-01-01

## 2018-01-01 RX ORDER — BUPRENORPHINE AND NALOXONE 8; 2 MG/1; MG/1
1 FILM, SOLUBLE BUCCAL; SUBLINGUAL
COMMUNITY

## 2018-01-01 RX ORDER — ONDANSETRON 8 MG/1
8 TABLET, FILM COATED ORAL EVERY 8 HOURS PRN
Qty: 15 TABLET | Refills: 0 | Status: SHIPPED | OUTPATIENT
Start: 2018-01-01

## 2018-01-01 RX ADMIN — SODIUM CHLORIDE 1000 ML: 9 INJECTION, SOLUTION INTRAVENOUS at 17:14

## 2018-01-01 RX ADMIN — ONDANSETRON 4 MG: 2 INJECTION INTRAMUSCULAR; INTRAVENOUS at 17:19

## 2018-01-01 ASSESSMENT — ENCOUNTER SYMPTOMS
ABDOMINAL PAIN: 0
NAUSEA: 1
DIARRHEA: 1
FEVER: 1
COUGH: 1
VOMITING: 1

## 2018-09-01 NOTE — PROGRESS NOTES
"  SUBJECTIVE:   Elvin Goff is a 28 year old male who presents to clinic today for the following health issues:      Acute Illness   Acute illness concerns: \"stomach pain\"  Onset: several days    Fever: YES    Chills/Sweats: YES    Headache (location?): YES    Sinus Pressure:YES    Conjunctivitis:  no    Ear Pain: no    Rhinorrhea: no     Congestion: YES,occ.    Sore Throat: YES     Cough: YES    Wheeze: no     Decreased Appetite: YES    Nausea: YES    Vomiting: YES    Diarrhea:  no     Dysuria/Freq.: no     Fatigue/Achiness: YES    Sick/Strep Exposure: no      Therapies Tried and outcome: nothing    Pt was seen at addiction clinic yesterday, started on Suboxone.  Prior to that he had been using Heroin or Morphine varying amounts          Problem list and histories reviewed & adjusted, as indicated.  Additional history: as documented    Labs reviewed in EPIC    Reviewed and updated as needed this visit by clinical staff       Reviewed and updated as needed this visit by Provider         ROS:  CONSTITUTIONAL:NEGATIVE for fever, chills, change in weight  RESP:NEGATIVE for significant cough or SOB  CV: NEGATIVE for chest pain, palpitations or peripheral edema  GI: POSITIVE for abdominal pain epigastric and LUQ, nausea and vomiting  NEURO: NEGATIVE for weakness, dizziness or paresthesias    OBJECTIVE:                                                    /68 (BP Location: Right arm, Patient Position: Chair, Cuff Size: Adult Regular)  Pulse 76  Temp 99.7  F (37.6  C)  Resp 20  Ht 5' 11\" (1.803 m)  Wt 148 lb (67.1 kg)  SpO2 97%  BMI 20.64 kg/m2  Body mass index is 20.64 kg/(m^2).  GENERAL APPEARANCE: healthy, alert and no distress  RESP: lungs clear to auscultation - no rales, rhonchi or wheezes  CV: regular rates and rhythm, normal S1 S2, no S3 or S4 and no murmur, click or rub  ABDOMEN: soft, nontender, without hepatosplenomegaly or masses and bowel sounds normal    Diagnostic test results:  Lab: see " below, results pending     ASSESSMENT/PLAN:                                                        ICD-10-CM    1. Abdominal pain, epigastric R10.13 CBC with platelets     Comprehensive metabolic panel (BMP + Alb, Alk Phos, ALT, AST, Total. Bili, TP)     omeprazole (PRILOSEC) 20 MG CR capsule   2. Nausea and vomiting, intractability of vomiting not specified, unspecified vomiting type R11.2 Comprehensive metabolic panel (BMP + Alb, Alk Phos, ALT, AST, Total. Bili, TP)     Amylase     Lipase     ondansetron (ZOFRAN) 8 MG tablet   3. Opioid dependence with withdrawal (H) F11.23        Follow up with Provider - as needed  Pt is scheduled for follow up at addiction clinic on Friday 9/7/18   Patient Instructions   Clear liquids for today, then advance diet      Guru Piña MD  [facility]

## 2018-09-01 NOTE — MR AVS SNAPSHOT
"              After Visit Summary   9/1/2018    Elvin Goff    MRN: [id number]           Patient Information     Date Of Birth          1990        Visit Information        Provider Department      9/1/2018 8:45 AM Guru Piña MD [facility]        Today's Diagnoses     Abdominal pain, epigastric    -  1    Nausea and vomiting, intractability of vomiting not specified, unspecified vomiting type        Opioid dependence with withdrawal (H)          Care Instructions    Clear liquids for today, then advance diet          Follow-ups after your visit        Who to contact     If you have questions or need follow up information about today's clinic visit or your schedule please contact [facility] directly at [phone].  Normal or non-critical lab and imaging results will be communicated to you by [employer], letter or phone within 4 business days after the clinic has received the results. If you do not hear from us within 7 days, please contact the clinic through [employer] or phone. If you have a critical or abnormal lab result, we will notify you by phone as soon as possible.  Submit refill requests through [employer] or call your pharmacy and they will forward the refill request to us. Please allow 3 business days for your refill to be completed.          Additional Information About Your Visit        [employer] Information     [employer] lets you send messages to your doctor, view your test results, renew your prescriptions, schedule appointments and more. To sign up, go to [email or web address] . Click on \"Log in\" on the left side of the screen, which will take you to the Welcome page. Then click on \"Sign up Now\" on the right side of the page.     You will be asked to enter the access code listed below, as well as some personal information. Please follow the directions to create your username and password.     Your access code is: " [email or web address]  Expires: 2018  9:01 AM     Your access code will  in 90 days. If you need help or a new code, please call your [facility] or [phone].        Care EveryWhere ID     This is your Care EveryWhere ID. This could be used by other organizations to access your [facility] medical records  [id number]        Your Vitals Were     Pulse Temperature Respirations Height Pulse Oximetry BMI (Body Mass Index)    76 99.7  F (37.6  C) 20 5' 11\" (1.803 m) 97% 20.64 kg/m2       Blood Pressure from Last 3 Encounters:   18 120/68   17 115/73   17 124/90    Weight from Last 3 Encounters:   18 148 lb (67.1 kg)   17 155 lb (70.3 kg)              We Performed the Following     Amylase     CBC with platelets     Comprehensive metabolic panel (BMP + Alb, Alk Phos, ALT, AST, Total. Bili, TP)     Lipase          Today's Medication Changes          These changes are accurate as of 18  9:01 AM.  If you have any questions, ask your nurse or doctor.               Start taking these medicines.        Dose/Directions    omeprazole 20 MG CR capsule   Commonly known as:  priLOSEC   Used for:  Abdominal pain, epigastric   Started by:  Guru Piña MD        Dose:  20 mg   Take 1 capsule (20 mg) by mouth daily   Quantity:  30 capsule   Refills:  1       ondansetron 8 MG tablet   Commonly known as:  ZOFRAN   Used for:  Nausea and vomiting, intractability of vomiting not specified, unspecified vomiting type   Started by:  Guru Piña MD        Dose:  8 mg   Take 1 tablet (8 mg) by mouth every 8 hours as needed for nausea   Quantity:  15 tablet   Refills:  0            Where to get your medicines      These medications were sent to [id number] - [place] [id number]    [address] [facility] [address]     Phone:  [phone]     omeprazole 20 MG CR capsule    ondansetron 8 MG tablet                Primary Care Provider Fax #    Physician No " Ref-Primary [phone]       No address on file        Equal Access to Services     BRITTANI CARTERROLANDA : Hadii aad sina lakshmi Johnson, waleida octavioloyha, delmar travistrever josuebillel, johnny sousa gomezterri perezitzkayleigh logancherylterri segundo. So [facility] [phone].    ATENCIÓN: Si habla español, tiene a jacques disposición servicios gratuitos de asistencia lingüística. Llame al [phone].    We comply with applicable federal civil rights laws and [place] laws. We do not discriminate on the basis of race, color, national origin, age, disability, sex, sexual orientation, or gender identity.            Thank you!     Thank you for choosing [facility]  for your care. Our goal is always to provide you with excellent care. Hearing back from our patients is one way we can continue to improve our services. Please take a few minutes to complete the written survey that you may receive in the mail after your visit with us. Thank you!             Your Updated Medication List - Protect others around you: Learn how to safely use, store and throw away your medicines at www.disposemymeds.org.          This list is accurate as of 9/1/18  9:01 AM.  Always use your most recent med list.                   Brand Name Dispense Instructions for use Diagnosis    buprenorphine HCl-naloxone HCl 8-2 MG per film    SUBOXONE     Place 1 Film under the tongue        cloNIDine 0.1 MG/24HR WK patch    CATAPRES-TTS1    4 patch    Place 1 patch onto the skin once a week    Opioid dependence with withdrawal (H), Opioid use disorder, severe, dependence (H)       gabapentin 300 MG capsule    NEURONTIN    90 capsule    Take 1 capsule (300 mg) by mouth 3 times daily    Opioid dependence with withdrawal (H), Anxiety disorder, unspecified type       naloxone 1 mg/mL for intranasal kit (2 syringes with 2 mucosal atomizer device)    NARCAN    1 kit    In opioid overdose put cone in nostril and push 1/2 of contents into each nostril.  Repeat every 3 min  if no response until help arrives.    Opioid use disorder, severe, dependence (H)       omeprazole 20 MG CR capsule    priLOSEC    30 capsule    Take 1 capsule (20 mg) by mouth daily    Abdominal pain, epigastric       ondansetron 8 MG tablet    ZOFRAN    15 tablet    Take 1 tablet (8 mg) by mouth every 8 hours as needed for nausea    Nausea and vomiting, intractability of vomiting not specified, unspecified vomiting type       traZODone 50 MG tablet    DESYREL     Take 50 mg by mouth

## 2018-09-01 NOTE — LETTER
September 5, 2018      Elvin SONNY Goff  [address] [place] [address]        Dear ,    We are writing to inform you of your test results.    Pancreas tests (Amylase, Lipase) are normal  Metabolic panel is essentially normal. Glucose slightly elevated  CBC is normal      Resulted Orders   CBC with platelets   Result Value Ref Range    WBC 10.1 4.0 - 11.0 10e9/L    RBC Count 4.43 4.4 - 5.9 10e12/L    Hemoglobin 13.5 13.3 - 17.7 g/dL    Hematocrit 40.4 40.0 - 53.0 %    MCV 91 78 - 100 fl    MCH 30.5 26.5 - 33.0 pg    MCHC 33.4 31.5 - 36.5 g/dL    RDW 12.6 10.0 - 15.0 %    Platelet Count 227 150 - 450 10e9/L   Comprehensive metabolic panel (BMP + Alb, Alk Phos, ALT, AST, Total. Bili, TP)   Result Value Ref Range    Sodium 141 133 - 144 mmol/L    Potassium 4.4 3.4 - 5.3 mmol/L    Chloride 107 94 - 109 mmol/L    Carbon Dioxide 28 20 - 32 mmol/L    Anion Gap 6 3 - 14 mmol/L    Glucose 103 (H) 70 - 99 mg/dL    Urea Nitrogen 19 7 - 30 mg/dL    Creatinine 0.80 0.66 - 1.25 mg/dL    GFR Estimate >90 >60 mL/min/1.7m2      Comment:      Non  GFR Calc    GFR Estimate If Black >90 >60 mL/min/1.7m2      Comment:       GFR Calc    Calcium 8.8 8.5 - 10.1 mg/dL    Bilirubin Total 0.4 0.2 - 1.3 mg/dL    Albumin 3.7 3.4 - 5.0 g/dL    Protein Total 7.3 6.8 - 8.8 g/dL    Alkaline Phosphatase 73 40 - 150 U/L    ALT 14 0 - 70 U/L    AST 10 0 - 45 U/L   Amylase   Result Value Ref Range    Amylase 35 30 - 110 U/L   Lipase   Result Value Ref Range    Lipase 92 73 - 393 U/L       If you have any questions or concerns, please call the clinic at the number listed above.       Sincerely,        Guru Piña MD

## 2018-09-03 NOTE — ED PROVIDER NOTES
"  History     Chief Complaint:  Nausea, Vomiting, & Diarrhea    HPI   Elvin Goff is a 28 year old male, with history of substance abuse, and opioid dependence, who presents with nausea, vomiting and diarrhea. 5 days ago, he noticed some burping which left a \"metallic taste\" in his mouth. His first emesis on Wednesday was dark brown, and he was unsure if there was blood in it or not. His emesis since then, has not been brown or contained blood. He has been nauseous, and vomiting for the past 4-5 days. He was seen in a [place] clinic two days ago, where he had a \"low grade fever\". He was given nausea medications, but he states that it did not help. He has vomited every day since initial nausea. Yesterday, he had loose stools, which were watery and brown. He states he has had about 4 bowel movements in the past few days. He has been unable to keep down PO since Wednesday. He also has a cough and a cold.     Of note, he has been using IV Heroin \"pretty heavily\" and last used on Friday. He also started Suboxone on Friday, and has been on this in the past. He has also been using marijuana daily, and states that he usually only uses about one hit a day.     Allergies:  No Known Drug Allergies      Medications:    Suboxone  Narcan  Prilosec  Zofran  Desyrel     Past Medical History:    Anxiety  Substance abuse    Past Surgical History:    The patient does not have any pertinent past surgical history.     Family History:    No past pertinent family history.     Social History:  Relationship status: Single  Tobacco use: Yes  Alcohol use: Yes  Heroin use: Yes  Marijuana use: Yes  The patient presents with his brother.    Marital Status:  Single [1]     Review of Systems   Constitutional: Positive for fever.   Respiratory: Positive for cough.    Gastrointestinal: Positive for diarrhea, nausea and vomiting. Negative for abdominal pain.   All other systems reviewed and are negative.    Physical Exam   Vitals:  Patient " "Vitals for the past 24 hrs:   BP Temp Temp src Heart Rate Resp SpO2 Height Weight   09/03/18 1846 131/84 - - - 18 - - -   09/03/18 1845 131/84 - - - - - - -   09/03/18 1629 142/77 97.9  F (36.6  C) Oral 67 18 97 % 1.803 m (5' 11\") 68 kg (150 lb)        Physical Exam    General: Alert and cooperative with exam. Patient in mild distress. Normal mentation.  Head:  Scalp is NC/AT  Eyes:  No scleral icterus, PERRL  ENT:  The external nose and ears are normal. The oropharynx is normal and without erythema; mucus membranes are moist. Uvula midline, no evidence of deep space infection.  Neck:  Normal range of motion without rigidity.  CV:  Regular rate and rhythm    No pathologic murmur   Resp:  Breath sounds are clear bilaterally    Non-labored, no retractions or accessory muscle use  GI:  Abdomen is soft, no distension, no tenderness. No peritoneal signs  MS:  No lower extremity edema   Skin:  Warm and dry, No rash or lesions noted.  Neuro: Oriented x 3. No gross motor deficits.     Emergency Department Course     Laboratory:  Laboratory findings were communicated with the patient who voiced understanding of the findings.  BMP: AWNL (Creatinine 0.85)    Interventions:  1714 Normal Saline 1000 mL IV   1719 Zofran 4 mg IV      Emergency Department Course:  Nursing notes and vitals reviewed.  I performed an exam of the patient as documented above.   IV was inserted and blood was drawn for laboratory testing, results above.     1929 I updated the patient and discussed discharge.    Findings and plan explained to the Patient. Patient discharged home with instructions regarding supportive care, medications, and reasons to return. The importance of close follow-up was reviewed.    I personally reviewed the laboratory results with the Patient and answered all related questions prior to discharge.    Impression & Plan      Medical Decision Making:  Elvin Goff is a 28 year old male who presents for nausea and vomiting; " history of substance abuse, smokes marijuana daily, and is on Suboxone.  Patient's medical history and records were reviewed.  Patient's vital signs within normal range on initial presentation.  Abdominal exam benign.  His provided IV fluids and Zofran with noted improvement in symptoms.  Basic metabolic panel without significant findings.  Presentation is likely multifactorial and may be related to marijuana use versus opioid withdrawal versus medication side effect (Suboxone).  No indication for further labs or imaging at this time.  Provided prescription for Zofran ODT and recommended cessation of marijuana use.  Supportive care and return precautions were discussed.  Patient discharged home.  At the time of discharge patient was hemodynamically stable, neurologically intact, afebrile, and tolerating oral intake.      Diagnosis:    ICD-10-CM    1. Nausea vomiting and diarrhea R11.2     R19.7    2. Marijuana abuse F12.10    3. Narcotic withdrawal (H) F11.23       Disposition:   Discharged to home    Discharge Medications:  New Prescriptions    ONDANSETRON (ZOFRAN ODT) 4 MG ODT TAB    Take 1 tablet (4 mg) by mouth every 6 hours as needed for nausea or vomiting       Scribe Disclosure:  Elin VICKERS, am serving as a scribe at 4:59 PM on 9/3/2018 to document services personally performed by Joel Steiner DO, based on my observations and the provider's statements to me.     Elin Landis  9/3/2018    EMERGENCY DEPARTMENT       Joel Steiner DO  09/05/18 0006

## 2018-09-03 NOTE — ED AVS SNAPSHOT
Emergency Department    [address] [facility] [address]    Phone:  [phone]    Fax:  [phone]                                       Elvin Goff   MRN: [id number]    Department:   Emergency Department   Date of Visit:  9/3/2018           After Visit Summary Signature Page     I have received my discharge instructions, and my questions have been answered. I have discussed any challenges I see with this plan with the nurse or doctor.    ..........................................................................................................................................  Patient/Patient Representative Signature      ..........................................................................................................................................  Patient Representative Print Name and Relationship to Patient    ..................................................               ................................................  Date                                            Time    ..........................................................................................................................................  Reviewed by Signature/Title    ...................................................              ..............................................  Date                                                            Time          22EPIC Rev 08/18

## 2018-09-03 NOTE — ED AVS SNAPSHOT
Emergency Department    [address] [facility] [address]    Phone:  [phone]    Fax:  [phone]                                       Elvin Goff   MRN: [id number]    Department:   Emergency Department   Date of Visit:  9/3/2018           Patient Information     Date Of Birth          1990        Your diagnoses for this visit were:     Marijuana abuse     Nausea vomiting and diarrhea     Narcotic withdrawal (H)        You were seen by Joel Steiner DO.      Follow-up Information     Follow up with Primary care doctor In 3 days.        Follow up with  Emergency Department.    Specialty:  EMERGENCY MEDICINE    Why:  If symptoms worsen    Contact information:    [address] [facility] [address] [phone]      Discharge References/Attachments     MARIJUANA ABUSE (ENGLISH)    DRUG ABUSE (ENGLISH)    OPIOID WITHDRAWAL (ENGLISH)      24 Hour Appointment Hotline       To make an appointment at any [facility], call [phone] ([phone]). If you don't have a family doctor or clinic, we will help you find one. [facility] clinics are conveniently located to serve the needs of you and your family.             Review of your medicines      START taking        Dose / Directions Last dose taken    ondansetron 4 MG ODT tab   Commonly known as:  ZOFRAN ODT   Dose:  4 mg   Quantity:  20 tablet        Take 1 tablet (4 mg) by mouth every 6 hours as needed for nausea or vomiting   Refills:  0          Our records show that you are taking the medicines listed below. If these are incorrect, please call your family doctor or clinic.        Dose / Directions Last dose taken    buprenorphine HCl-naloxone HCl 8-2 MG per film   Commonly known as:  SUBOXONE   Dose:  1 Film        Place 1 Film under the tongue   Refills:  0        naloxone 1 mg/mL for intranasal kit (2 syringes with 2 mucosal atomizer device)   Commonly known as:  NARCAN   Quantity:  1  kit        In opioid overdose put cone in nostril and push 1/2 of contents into each nostril.  Repeat every 3 min if no response until help arrives.   Refills:  1        omeprazole 20 MG CR capsule   Commonly known as:  priLOSEC   Dose:  20 mg   Quantity:  30 capsule        Take 1 capsule (20 mg) by mouth daily   Refills:  1        ondansetron 8 MG tablet   Commonly known as:  ZOFRAN   Dose:  8 mg   Quantity:  15 tablet        Take 1 tablet (8 mg) by mouth every 8 hours as needed for nausea   Refills:  0        traZODone 50 MG tablet   Commonly known as:  DESYREL   Dose:  50 mg        Take 50 mg by mouth   Refills:  0                Prescriptions were sent or printed at these locations (1 Prescription)                   Other Prescriptions                Printed at Department/Unit printer (1 of 1)         ondansetron (ZOFRAN ODT) 4 MG ODT tab                Procedures and tests performed during your visit     Basic metabolic panel      Orders Needing Specimen Collection     None      Pending Results     No orders found from 9/1/2018 to 9/4/2018.            Pending Culture Results     No orders found from 9/1/2018 to 9/4/2018.            Pending Results Instructions     If you had any lab results that were not finalized at the time of your Discharge, you can call the ED Lab Result RN at [phone]. You will be contacted by this team for any positive Lab results or changes in treatment. The nurses are available 7 days a week from 10A to 6:30P.  You can leave a message 24 hours per day and they will return your call.        Test Results From Your Hospital Stay        9/3/2018  5:41 PM      Component Results     Component Value Ref Range & Units Status    Sodium 140 133 - 144 mmol/L Final    Potassium 4.0 3.4 - 5.3 mmol/L Final    Chloride 102 94 - 109 mmol/L Final    Carbon Dioxide 31 20 - 32 mmol/L Final    Anion Gap 7 3 - 14 mmol/L Final    Glucose 91 70 - 99 mg/dL Final    Urea Nitrogen 14 7 - 30 mg/dL Final     Creatinine 0.85 0.66 - 1.25 mg/dL Final    GFR Estimate >90 >60 mL/min/1.7m2 Final    Non  GFR Calc    GFR Estimate If Black >90 >60 mL/min/1.7m2 Final    African American GFR Calc    Calcium 8.9 8.5 - 10.1 mg/dL Final                Clinical Quality Measure: Blood Pressure Screening     Your blood pressure was checked while you were in the emergency department today. The last reading we obtained was  BP: 131/84 . Please read the guidelines below about what these numbers mean and what you should do about them.  If your systolic blood pressure (the top number) is less than 120 and your diastolic blood pressure (the bottom number) is less than 80, then your blood pressure is normal. There is nothing more that you need to do about it.  If your systolic blood pressure (the top number) is 120-139 or your diastolic blood pressure (the bottom number) is 80-89, your blood pressure may be higher than it should be. You should have your blood pressure rechecked within a year by a primary care provider.  If your systolic blood pressure (the top number) is 140 or greater or your diastolic blood pressure (the bottom number) is 90 or greater, you may have high blood pressure. High blood pressure is treatable, but if left untreated over time it can put you at risk for heart attack, stroke, or kidney failure. You should have your blood pressure rechecked by a primary care provider within the next 4 weeks.  If your provider in the emergency department today gave you specific instructions to follow-up with your doctor or provider even sooner than that, you should follow that instruction and not wait for up to 4 weeks for your follow-up visit.        Thank you for choosing [facility]       Thank you for choosing [facility] for your care. Our goal is always to provide you with excellent care. Hearing back from our patients is one way we can continue to improve our services. Please take a few minutes to complete the written  "survey that you may receive in the mail after you visit with us. Thank you!        [employer] Information     [email or web address] lets you send messages to your doctor, view your test results, renew your prescriptions, schedule appointments and more. To sign up, go to [email or web address] . Click on \"Log in\" on the left side of the screen, which will take you to the Welcome page. Then click on \"Sign up Now\" on the right side of the page.     You will be asked to enter the access code listed below, as well as some personal information. Please follow the directions to create your username and password.     Your access code is: [email or web address]  Expires: 2018  9:01 AM     Your access code will  in 90 days. If you need help or a new code, please call your [facility] clinic or [phone].        Care EveryWhere ID     This is your Care EveryWhere ID. This could be used by other organizations to access your [facility] medical records  [id number]        Equal Access to Services     BRITTANI PRAKASH : Hadcris kingo Soanna, waaxda luqadaha, qaybta kaalmael barrett, johnny sanders . So [facility] [phone].    ATENCIÓN: Si habla español, tiene a jacques disposición servicios gratuitos de asistencia lingüística. Llame al [phone].    We comply with applicable federal civil rights laws and [place] laws. We do not discriminate on the basis of race, color, national origin, age, disability, sex, sexual orientation, or gender identity.            After Visit Summary       This is your record. Keep this with you and show to your community pharmacist(s) and doctor(s) at your next visit.                  "

## 2019-01-01 ENCOUNTER — HOSPITAL ENCOUNTER (EMERGENCY)
Facility: CLINIC | Age: 29
Discharge: HOME | End: 2019-05-25
Attending: EMERGENCY MEDICINE | Admitting: EMERGENCY MEDICINE
Payer: COMMERCIAL

## 2019-01-01 VITALS
HEART RATE: 81 BPM | SYSTOLIC BLOOD PRESSURE: 112 MMHG | BODY MASS INDEX: 23.1 KG/M2 | HEIGHT: 71 IN | TEMPERATURE: 98.3 F | WEIGHT: 165 LBS | OXYGEN SATURATION: 95 % | RESPIRATION RATE: 16 BRPM | DIASTOLIC BLOOD PRESSURE: 68 MMHG

## 2019-01-01 DIAGNOSIS — T40.1X1A HEROIN OVERDOSE, ACCIDENTAL OR UNINTENTIONAL, INITIAL ENCOUNTER (H): ICD-10-CM

## 2019-01-01 PROCEDURE — 99285 EMERGENCY DEPT VISIT HI MDM: CPT

## 2019-01-01 ASSESSMENT — ENCOUNTER SYMPTOMS: NAUSEA: 1

## 2019-01-01 ASSESSMENT — MIFFLIN-ST. JEOR: SCORE: 1725.57

## 2019-05-25 NOTE — ED AVS SNAPSHOT
Emergency Department  [address] [facility] [address]  Phone:  [phone]  Fax:  [phone]                                    Elvin Goff   MRN: [id number]    Department:   Emergency Department   Date of Visit:  5/25/2019           After Visit Summary Signature Page    I have received my discharge instructions, and my questions have been answered. I have discussed any challenges I see with this plan with the nurse or doctor.    ..........................................................................................................................................  Patient/Patient Representative Signature      ..........................................................................................................................................  Patient Representative Print Name and Relationship to Patient    ..................................................               ................................................  Date                                   Time    ..........................................................................................................................................  Reviewed by Signature/Title    ...................................................              ..............................................  Date                                               Time          22EPIC Rev 08/18

## 2019-05-26 NOTE — ED TRIAGE NOTES
Pt presents via EMS.  Pt was found unconscious by mother at home after using Heroin.  2 doses of Narcan was given per mother IN at 1830.  Pt woke up after Narcan.  VSS at this time.  Pt is on YOCASTA hold.

## 2019-05-26 NOTE — ED NOTES
Bed: ED22  Expected date:   Expected time:   Means of arrival:   Comments:  536-29M Found Down/Narcan given/Alert now

## 2019-05-26 NOTE — ED PROVIDER NOTES
History     Chief Complaint:  Drug Overdose      The history is provided by the patient and the EMS personnel. The history is limited by the condition of the patient.      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 Patient took heroin at 1800. Patient unclear how much he took, but states that he did shoot up instead of smoking. Mother found him unresponsive. At 6571-3399, patient s mother gave patient 2 dose of Narcan intranasal. EMS notes that patient was nauseous when they found him, and received Zofran 4mg.  Patient was alert, awake and oriented for EMS. Vitals stable with BP at 130/90. On Health Officer Hold, per EMS. Patient declines help with quitting, and states that he does not want to. No chest pain. Denies suicidal and homicidal ideation.     Allergies:  No Known Drug Allergies     Medications:    Suboxone   Narcan   Omeprazole   Zofran   Trazodone     Past Medical History:    Substance abuse   Opioid dependence  Severe sedative, hypnotic or anxiolytic use disorder  Sedative withdrawal   Anxiety disorder     Past Surgical History:    History reviewed. No pertinent past surgical history.     Family History:    History reviewed. No pertinent family history.      Social History:  The patient was accompanied to the ED by EMS.  Smoking Status: Current every day smoker  Smokeless Tobacco: Current user  Alcohol Use: yes  Drug use: yes, cocaine, opiates, marijuana, heroin  Marital Status:  Single     Review of Systems   Cardiovascular: Negative for chest pain.   Gastrointestinal: Positive for nausea.   Psychiatric/Behavioral: Negative for suicidal ideas.   All other systems reviewed and are negative.        Physical Exam     Patient Vitals for the past 24 hrs:   BP Temp Temp  "src Pulse Heart Rate Resp SpO2 Height Weight   05/25/19 2145 -- -- -- -- -- -- 95 % -- --   05/25/19 2137 112/68 -- -- 81 -- 16 98 % -- --   05/25/19 2130 -- -- -- -- -- -- 98 % -- --   05/25/19 2100 -- -- -- -- -- -- 98 % -- --   05/25/19 2045 -- -- -- -- -- -- 99 % -- --   05/25/19 2030 -- -- -- -- -- -- 98 % -- --   05/25/19 2015 -- -- -- -- -- -- 100 % -- --   05/25/19 2000 -- -- -- -- -- -- 100 % -- --   05/25/19 1930 -- -- -- -- -- -- 97 % -- --   05/25/19 1902 134/80 98.3  F (36.8  C) Temporal -- 70 18 100 % 1.803 m (5' 11\") 74.8 kg (165 lb)        Physical Exam  General: Alert, interactive  Head:  Scalp is atraumatic  Eyes:  The pupils are equal, round, and reactive to light    EOM's intact    No scleral icterus  ENT:      Nose:  The external nose is normal  Ears:  External ears are normal  Mouth/Throat: The oropharynx is normal    Mucus membranes are moist       Neck:  Normal range of motion.      There is no rigidity.    Trachea is in the midline         CV:  Regular rate and rhythm    No murmur   Resp:  Breath sounds are clear bilaterally    Non-labored, no retractions or accessory muscle use      MS:  Normal strength in all 4 extremities  Skin:  Warm and dry, No rash or lesions noted.  Neuro: Strength 5/5 x4.      GCS: 15  Psych:  Awake. Alert.  Normal affect.      Appropriate interactions.    Denies SI or HI     Emergency Department Course     Emergency Department Course:  Nursing notes and vitals reviewed.  Patient placed on Health Officer Hold.     1901: I performed an exam of the patient as documented above.     1902: I spoke with the Poison Control Center regarding patient's presentation and plan of care.     Findings and plan explained to the Patient. Patient discharged home with instructions regarding supportive care, medications, and reasons to return. The importance of close follow-up was reviewed.     Impression & Plan      Medical Decision Making:  Elvin Goff is a 29 year old male " who presents to the emergency department today after drug overdose. He was seen and evaluated. Poison control was contacted. He received narcan prior to arrival in the emergency department. He was observed in the emergency department for a total of four hours. He denies the desire for chemical dependency treatment and denies suicidal or homicidal ideation. He is not acutely holdable, and will be discharged to home. He was advised to avoid opioids.    Diagnosis:    ICD-10-CM   1. Heroin overdose, accidental or unintentional, initial encounter (H) T40.1X1A       Disposition:  Discharged to home.     Scribe Disclosure:  May VICKERS, am serving as a scribe at 7:07 PM on 5/25/2019 to document services personally performed by Juarez Najera MD based on my observations and the provider's statements to me.   5/25/2019    EMERGENCY DEPARTMENT       Juarez Najera MD  05/26/19 0031

## 2019-05-26 NOTE — ED NOTES
Pt. Given scrubs to change into, is cooperative. Belongings out of room and being searched and recorded. Meal and water given. Pt. Is appreciative. Brother now in room and brought pt cell phone.

## 2019-07-15 ENCOUNTER — TELEPHONE (OUTPATIENT)
Dept: FAMILY MEDICINE | Facility: CLINIC | Age: 29
End: 2019-07-15

## 2019-07-15 NOTE — TELEPHONE ENCOUNTER
[place] medical examiner called requesting a verbal list of problem and medication list - given. Patient  on 19 due to suspected heroin overdose at his home in [place]. FYI sent to Dr. Piña.